# Patient Record
Sex: FEMALE | Race: BLACK OR AFRICAN AMERICAN | NOT HISPANIC OR LATINO | Employment: FULL TIME | URBAN - METROPOLITAN AREA
[De-identification: names, ages, dates, MRNs, and addresses within clinical notes are randomized per-mention and may not be internally consistent; named-entity substitution may affect disease eponyms.]

---

## 2022-07-11 ENCOUNTER — MEDICAL CORRESPONDENCE (OUTPATIENT)
Dept: HEALTH INFORMATION MANAGEMENT | Facility: CLINIC | Age: 52
End: 2022-07-11

## 2022-07-13 ENCOUNTER — TRANSCRIBE ORDERS (OUTPATIENT)
Dept: OTHER | Age: 52
End: 2022-07-13

## 2022-07-13 DIAGNOSIS — C84.A0 CUTANEOUS T-CELL LYMPHOMA, UNSPECIFIED BODY REGION (H): Primary | ICD-10-CM

## 2022-08-13 NOTE — TELEPHONE ENCOUNTER
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 Lanette Dejesus & Dr[facility] Winter Jean @  [facility]. Phone: [phone], Fax: [phone], CTCL clinic w/Dr. Mart, ref-recs in [facility]    RECORDS REQUESTED FROM:       Clinic name Comments Records Status   [facility] Derm 7.11.22, 6.27.22  Oleg  Path # [id number] CE    Oncology 7.28.22  Ding CE    Internal Med 7.22.22  Misha GAMBOA

## 2022-08-15 ENCOUNTER — PRE VISIT (OUTPATIENT)
Dept: DERMATOLOGY | Facility: CLINIC | Age: 52
End: 2022-08-15

## 2022-08-15 ENCOUNTER — OFFICE VISIT (OUTPATIENT)
Dept: DERMATOLOGY | Facility: CLINIC | Age: 52
End: 2022-08-15
Payer: COMMERCIAL

## 2022-08-15 DIAGNOSIS — C84.A0 CUTANEOUS T-CELL LYMPHOMA, UNSPECIFIED BODY REGION (H): ICD-10-CM

## 2022-08-15 PROCEDURE — 99203 OFFICE O/P NEW LOW 30 MIN: CPT | Mod: GC | Performed by: DERMATOLOGY

## 2022-08-15 RX ORDER — EPINEPHRINE 0.3 MG/.3ML
INJECTION SUBCUTANEOUS
COMMUNITY
Start: 2021-10-22

## 2022-08-15 RX ORDER — FAMOTIDINE 20 MG/1
1 TABLET, FILM COATED ORAL AT BEDTIME
COMMUNITY
Start: 2022-06-30

## 2022-08-15 RX ORDER — FLUTICASONE PROPIONATE 50 MCG
1-2 SPRAY, SUSPENSION (ML) NASAL
COMMUNITY
Start: 2021-10-14

## 2022-08-15 RX ORDER — LORATADINE 10 MG/1
10 TABLET ORAL
COMMUNITY

## 2022-08-15 RX ORDER — PANTOPRAZOLE SODIUM 40 MG/1
1 TABLET, DELAYED RELEASE ORAL DAILY
COMMUNITY
Start: 2021-06-30

## 2022-08-15 RX ORDER — DIPHENOXYLATE HYDROCHLORIDE AND ATROPINE SULFATE 2.5; .025 MG/1; MG/1
1 TABLET ORAL DAILY
COMMUNITY

## 2022-08-15 RX ORDER — FLUTICASONE PROPIONATE 50 MCG
SPRAY, SUSPENSION (ML) NASAL
COMMUNITY
Start: 2022-05-01

## 2022-08-15 RX ORDER — OLOPATADINE HYDROCHLORIDE 1 MG/ML
1 SOLUTION/ DROPS OPHTHALMIC
COMMUNITY
Start: 2022-05-02

## 2022-08-15 RX ORDER — TRIAMCINOLONE ACETONIDE 1 MG/G
OINTMENT TOPICAL
COMMUNITY
Start: 2022-07-11

## 2022-08-15 ASSESSMENT — PAIN SCALES - GENERAL: PAINLEVEL: NO PAIN (0)

## 2022-08-15 NOTE — PATIENT INSTRUCTIONS
Based on the tests you've had done thus far, we think you have a low stage of cutaneous T cell lymphoma that seems to be limited to your skin. Yours is stage 1b (>10% of your body surface area), CD8+ (type of T blood cell, hypopigmented variety (lighter patches).    Continue light treatments 2-3 times per week. Continue triamcinolone as prescribed. These treatments can take 4 months to show improvement. We will check in in 3 months and see how things are going.

## 2022-08-15 NOTE — NURSING NOTE
Chief Complaint   Patient presents with     Derm Problem     CTCL. Pt reports condition is ok.     Vickey Diaz, EMT

## 2022-08-15 NOTE — LETTER
Date:August 16, 2022      Provider requested that no letter be sent. Do not send.       [facility]

## 2022-08-15 NOTE — PROGRESS NOTES
[facility] Dermatology Note  Encounter Date: Aug 15, 2022  Office Visit     Dermatology Problem List:  1. Stage 1B, CD8+ hypopigmented MF  - Rash present x 20 years  - >80% BSA  - Workup (done at )  - Skin bx, L hip 6/2022:   - Atypical CD8 positive T-cell lymphocytic infiltrate with epidermotropism. 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 Appears to be skin limited based on the thorough evaluation outlined in the DPL. Discussed the following: while most cases of MF are CD4+, hers is CD8+ as is often seen in the hypopigmented variant. Discussed that many cases of CD8+ CTCL have a quite a favorable prognosis but in rare cases can exhibit rapid progression with extracutaneous dissemination/poor prognosis. Based on exam and workup thus far we expect a good response to skin-directed therapies and favorable prognosis. Light therapy is particularly useful in the hypopigmented variant but can take about 4 months to start showing clinical improvement.  - Continue nbUVB 2-3x weekly  - Continue triamcinolone 0.1% BID to affected areas    Procedures Performed:   None    Follow-up: 3 month(s) in-person, or earlier  "for new or changing lesions    Staff and Resident:     Shirley Hernandez MD  Dermatology Resident PGY3  I, Priyanka Mart MD, saw this patient with the resident and agree with the resident s findings and plan of care as documented in the resident s note.    ____________________________________________    CC: Derm Problem (CTCL. Pt reports condition is ok.)    HPI:  Ms. Jaylon Lugo is a(n) 51 year old female who presents today as a new patient for CTCL referral.    Onset 20 years ago, initial bx on L upper arm at that time showed \"eczema.\" Recently this summer decided to pursue workup again given lonstanding sx and started following at  at which time a repeat bx was done suspicious for CTCL. Systemic workup suggestive of skin involvement only (see DPL). She notes that she has overall felt well recently. On ROS has MSK pains in chest alleviated with OT. Also had an asymptomatic HR below 50 noticed on Fitbit which was worked up with EKG which was normal. She gets tired here and there but that is typically related to less than 8 hours of sleep. No fever, chills, or unintential weight loss. Had some transient inguinal painless LAD starting earlier this summer but this went away and PET this month was negative. Has been doing lights for 4 weeks, going twice weekly, unclear if helpful. Triamcinolone twice daily. History of eczema as well but not for years and this rash is distinctly different. Skin is asymptomatic.     Patient is otherwise feeling well, without additional skin concerns.    Labs Reviewed:  - Skin bx, L hip 6/2022: Atypical CD8 positive T-cell lymphocytic infiltrate with epidermotropism. Marked shift toward CD8 in the CD4:CD8 ratio within the epidermis and there is near total loss of CD7 in the epidermal compartment  - Peripheral smear 7/2022: unremarkable  - Peripheral blood TCR studies: clonal T cell population but no skin TCR studies to correlate  - Peripheral blood flow cytometry 7/28/22: negative " for an aberrant T cell population, CD4:CD8 ratio 0.9:1  - PET 8/10/22: no metabolic evidence of active neoplasm    Physical Exam:  Vitals: There were no vitals taken for this visit.  SKIN: Total skin excluding the undergarment areas was performed. The exam included the head/face, neck, both arms, chest, back, abdomen, both legs, digits and/or nails.   - From the upper chest down there are widespread hypopigmented patches encompassing >80% BSA. Face relatively spared.  - No palpable inguinal LAD  - No other lesions of concern on areas examined.             Medications:  Current Outpatient Medications   Medication     fluticasone (FLONASE) 50 MCG/ACT nasal spray     olopatadine (PATANOL) 0.1 % ophthalmic solution     pantoprazole (PROTONIX) 40 MG EC tablet     triamcinolone (KENALOG) 0.1 % external ointment     EPINEPHrine (ANY BX GENERIC EQUIV) 0.3 MG/0.3ML injection 2-pack     famotidine (PEPCID) 20 MG tablet     fluticasone (FLONASE) 50 MCG/ACT nasal spray     loratadine (CLARITIN) 10 MG tablet     minoxidil (ROGAINE) 5 % external solution     Multiple Vitamin (MULTI-VITAMINS) TABS     No current facility-administered medications for this visit.      Past Medical History: Heart murmur, CLEO  There is no problem list on file for this patient.    History reviewed. No pertinent past medical history.    CC Provider Not In System    on close of this encounter.

## 2022-08-15 NOTE — LETTER
8/15/2022       RE: Jaylon Lugo  [address]  [address]  [place] [address]     Dear Colleague,    Thank you for referring your patient, Jaylon Lugo, to the [facility] DERMATOLOGY [facility] [place] at [facility]. Please see a copy of my visit note below.      [facility] Dermatology Note  Encounter Date: Aug 15, 2022  Office Visit     Dermatology Problem List:  1. Stage 1B, CD8+ hypopigmented MF  - Rash present x 20 years  - >80% BSA  - Workup (done at )  - Skin bx, L hip 6/2022:   - Atypical CD8 positive T-cell lymphocytic infiltrate with epidermotropism. 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 Appears to be skin limited based on the thorough evaluation outlined in the DPL. Discussed the following: while most cases of MF are CD4+, hers is CD8+ as is often seen in the hypopigmented variant. Discussed that many cases of CD8+ CTCL have a quite a favorable prognosis but in rare cases can exhibit rapid progression with extracutaneous dissemination/poor prognosis. Based on exam and workup thus far we expect a good response to skin-directed  "therapies and favorable prognosis. Light therapy is particularly useful in the hypopigmented variant but can take about 4 months to start showing clinical improvement.  - Continue nbUVB 2-3x weekly  - Continue triamcinolone 0.1% BID to affected areas    Procedures Performed:   None    Follow-up: 3 month(s) in-person, or earlier for new or changing lesions    Staff and Resident:     Shirley Hernandez MD  Dermatology Resident PGY3  I, Priyanka Mart MD, saw this patient with the resident and agree with the resident s findings and plan of care as documented in the resident s note.    ____________________________________________    CC: Derm Problem (CTCL. Pt reports condition is ok.)    HPI:  Ms. Jaylon Lugo is a(n) 51 year old female who presents today as a new patient for CTCL referral.    Onset 20 years ago, initial bx on L upper arm at that time showed \"eczema.\" Recently this summer decided to pursue workup again given lonstanding sx and started following at  at which time a repeat bx was done suspicious for CTCL. Systemic workup suggestive of skin involvement only (see DPL). She notes that she has overall felt well recently. On ROS has MSK pains in chest alleviated with OT. Also had an asymptomatic HR below 50 noticed on Fitbit which was worked up with EKG which was normal. She gets tired here and there but that is typically related to less than 8 hours of sleep. No fever, chills, or unintential weight loss. Had some transient inguinal painless LAD starting earlier this summer but this went away and PET this month was negative. Has been doing lights for 4 weeks, going twice weekly, unclear if helpful. Triamcinolone twice daily. History of eczema as well but not for years and this rash is distinctly different. Skin is asymptomatic.     Patient is otherwise feeling well, without additional skin concerns.    Labs Reviewed:  - Skin bx, L hip 6/2022: Atypical CD8 positive T-cell lymphocytic infiltrate with " epidermotropism. Marked shift toward CD8 in the CD4:CD8 ratio within the epidermis and there is near total loss of CD7 in the epidermal compartment  - Peripheral smear 7/2022: unremarkable  - Peripheral blood TCR studies: clonal T cell population but no skin TCR studies to correlate  - Peripheral blood flow cytometry 7/28/22: negative for an aberrant T cell population, CD4:CD8 ratio 0.9:1  - PET 8/10/22: no metabolic evidence of active neoplasm    Physical Exam:  Vitals: There were no vitals taken for this visit.  SKIN: Total skin excluding the undergarment areas was performed. The exam included the head/face, neck, both arms, chest, back, abdomen, both legs, digits and/or nails.   - From the upper chest down there are widespread hypopigmented patches encompassing >80% BSA. Face relatively spared.  - No palpable inguinal LAD  - No other lesions of concern on areas examined.             Medications:  Current Outpatient Medications   Medication     fluticasone (FLONASE) 50 MCG/ACT nasal spray     olopatadine (PATANOL) 0.1 % ophthalmic solution     pantoprazole (PROTONIX) 40 MG EC tablet     triamcinolone (KENALOG) 0.1 % external ointment     EPINEPHrine (ANY BX GENERIC EQUIV) 0.3 MG/0.3ML injection 2-pack     famotidine (PEPCID) 20 MG tablet     fluticasone (FLONASE) 50 MCG/ACT nasal spray     loratadine (CLARITIN) 10 MG tablet     minoxidil (ROGAINE) 5 % external solution     Multiple Vitamin (MULTI-VITAMINS) TABS     No current facility-administered medications for this visit.      Past Medical History: Heart murmur, CLEO  There is no problem list on file for this patient.    History reviewed. No pertinent past medical history.    CC Provider Not In System    on close of this encounter.          Again, thank you for allowing me to participate in the care of your patient.      Sincerely,    Priyanka Mart MD

## 2022-11-16 ENCOUNTER — OFFICE VISIT (OUTPATIENT)
Dept: DERMATOLOGY | Facility: CLINIC | Age: 52
End: 2022-11-16
Payer: COMMERCIAL

## 2022-11-16 VITALS — HEART RATE: 52 BPM | DIASTOLIC BLOOD PRESSURE: 81 MMHG | SYSTOLIC BLOOD PRESSURE: 141 MMHG

## 2022-11-16 DIAGNOSIS — C84.A0 CUTANEOUS T-CELL LYMPHOMA, UNSPECIFIED BODY REGION (H): Primary | ICD-10-CM

## 2022-11-16 PROCEDURE — 99214 OFFICE O/P EST MOD 30 MIN: CPT | Mod: GC | Performed by: DERMATOLOGY

## 2022-11-16 RX ORDER — TRIAMCINOLONE ACETONIDE 1 MG/G
OINTMENT TOPICAL
Status: CANCELLED | OUTPATIENT
Start: 2022-11-16

## 2022-11-16 ASSESSMENT — PAIN SCALES - GENERAL: PAINLEVEL: NO PAIN (1)

## 2022-11-16 NOTE — LETTER
11/16/2022       RE: Jaylon Lugo  [address]  [address]  [place] [address]     Dear Colleague,    Thank you for referring your patient, Jaylon Lugo, to the [facility] DERMATOLOGY CLINIC [place] at [facility]. Please see a copy of my visit note below.    [facility] Dermatology Note  Encounter Date: Nov 16, 2022  Office Visit     Dermatology Problem List:  1. Stage 1B, CD8+ hypopigmented MF  - Rash present x 20 years  - >80% BSA on initial consult; visit 11/16/22 with involvement of dorsal feet, inner arms and legs  - Workup (done at )  - Skin bx, L hip 6/2022:   - Atypical CD8 positive T-cell lymphocytic infiltrate with epidermotropism. 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 Continue to favor skin limited based on the thorough evaluation outlined in the DPL and excellent response so far to UVB BIW. Discussed the following: while most cases of MF are CD4+, hers is CD8+ as is often seen in the hypopigmented variant. Discussed that many cases of CD8+ CTCL have a quite a favorable prognosis but in rare cases can  exhibit rapid progression with extracutaneous dissemination/poor prognosis which fortunately hers has not. Recent CBC with no abnormalities. Based on exam and workup thus far we continue to expect a good response to skin-directed therapies and favorable prognosis. Light therapy is very useful in the hypopigmented variant and she is starting to show clinical improvement. Discussed that legs do take longer to heal compared to trunk and that once disease is in remission can consider restarting lights with emphasis on inner arms which is currently being shadowed during her treatments. We also recommend she start a foot box to treat her dorsal feet. She is endorsing night sweats but has no LAD and no fatigue, weight loss or other B symptoms and normal CBC this week so seems most consistent with menopause.  - Continue nbUVB 2-3x weekly, would start foot unit to treat dorsal feet   - Once all disease is treated, can consider titrating down on light therapies to see if maintenance treatment is needed  - Continue triamcinolone 0.1% BID to affected areas            Follow-up: 4 month(s) in-person, or earlier for new or changing lesions    Staff and Resident:    ICruz MD, discussed and evaluated the patient with Dr. Mart.  IPriyanka MD, saw this patient with the resident and agree with the resident s findings and plan of care as documented in the resident s note.    ____________________________________________    CC: Derm Problem (Follow-up for T-Cell/Things are going okay for patient: bumps on right arm that seem to have disappeared/Check feet)    HPI:  Ms. Jaylon Lugo is a(n) 51 year old female who presents today as a return patient for CTCL. Doing lights at [facility] twice weekly in addition to triam BID. Having some hot flashes and night sweats but no fatigue. Had some bumps on arms that itched used triam ointment and have resolved. Itch is improving. Noticing signficant improvement to appearance of  trunk lesions, still hypopigmentation of legs and feet.    Patient is otherwise feeling well, without additional skin concerns.    Labs Reviewed:  CBC WNL 11/14/22    Physical Exam:  Vitals: There were no vitals taken for this visit.  SKIN: Total skin excluding the undergarment areas was performed. The exam included the head/face, neck, both arms, chest, back, abdomen, both legs, digits and/or nails.   - From the inner arms, dorsal feet and legs there are widespread hypopigmented patches encompassing 40-50% BSA with few small hypopigmented patches on arms and abdomen. There are some patches of hyperpigmentation of right arm. Face relatively spared.  - No palpable cervical, postauricular, submandibular, supraclavicular or axillary LAD  - No other lesions of concern on areas examined.     Medications:  Current Outpatient Medications   Medication     EPINEPHrine (ANY BX GENERIC EQUIV) 0.3 MG/0.3ML injection 2-pack     famotidine (PEPCID) 20 MG tablet     loratadine (CLARITIN) 10 MG tablet     minoxidil (ROGAINE) 5 % external solution     olopatadine (PATANOL) 0.1 % ophthalmic solution     pantoprazole (PROTONIX) 40 MG EC tablet     triamcinolone (KENALOG) 0.1 % external ointment     fluticasone (FLONASE) 50 MCG/ACT nasal spray     fluticasone (FLONASE) 50 MCG/ACT nasal spray     Multiple Vitamin (MULTI-VITAMINS) TABS     No current facility-administered medications for this visit.      Past Medical History:   There is no problem list on file for this patient.    History reviewed. No pertinent past medical history.    CC Provider Not In System    on close of this encounter.      Again, thank you for allowing me to participate in the care of your patient.      Sincerely,    Priyanka Mart MD

## 2022-11-16 NOTE — LETTER
Date:November 18, 2022      Provider requested that no letter be sent. Do not send.       [facility]

## 2022-11-16 NOTE — PROGRESS NOTES
[facility] Dermatology Note  Encounter Date: Nov 16, 2022  Office Visit     Dermatology Problem List:  1. Stage 1B, CD8+ hypopigmented MF  - Rash present x 20 years  - >80% BSA on initial consult; visit 11/16/22 with involvement of dorsal feet, inner arms and legs  - Workup (done at )  - Skin bx, L hip 6/2022:   - Atypical CD8 positive T-cell lymphocytic infiltrate with epidermotropism. 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 Continue to favor skin limited based on the thorough evaluation outlined in the DPL and excellent response so far to UVB BIW. Discussed the following: while most cases of MF are CD4+, hers is CD8+ as is often seen in the hypopigmented variant. Discussed that many cases of CD8+ CTCL have a quite a favorable prognosis but in rare cases can exhibit rapid progression with extracutaneous dissemination/poor prognosis which fortunately hers has not. Recent CBC with no abnormalities. Based on exam and workup thus far we continue to expect a good response to skin-directed therapies and favorable prognosis. Light therapy is very useful in the hypopigmented variant and she is  starting to show clinical improvement. Discussed that legs do take longer to heal compared to trunk and that once disease is in remission can consider restarting lights with emphasis on inner arms which is currently being shadowed during her treatments. We also recommend she start a foot box to treat her dorsal feet. She is endorsing night sweats but has no LAD and no fatigue, weight loss or other B symptoms and normal CBC this week so seems most consistent with menopause.  - Continue nbUVB 2-3x weekly, would start foot unit to treat dorsal feet   - Once all disease is treated, can consider titrating down on light therapies to see if maintenance treatment is needed  - Continue triamcinolone 0.1% BID to affected areas            Follow-up: 4 month(s) in-person, or earlier for new or changing lesions    Staff and Resident:    ICruz MD, discussed and evaluated the patient with Dr. Mart.  IPriyanka MD, saw this patient with the resident and agree with the resident s findings and plan of care as documented in the resident s note.    ____________________________________________    CC: Derm Problem (Follow-up for T-Cell/Things are going okay for patient: bumps on right arm that seem to have disappeared/Check feet)    HPI:  Ms. Jaylon Lugo is a(n) 51 year old female who presents today as a return patient for CTCL. Doing lights at [facility] twice weekly in addition to triam BID. Having some hot flashes and night sweats but no fatigue. Had some bumps on arms that itched used triam ointment and have resolved. Itch is improving. Noticing signficant improvement to appearance of trunk lesions, still hypopigmentation of legs and feet.    Patient is otherwise feeling well, without additional skin concerns.    Labs Reviewed:  CBC WNL 11/14/22    Physical Exam:  Vitals: There were no vitals taken for this visit.  SKIN: Total skin excluding the undergarment areas was performed. The exam included the head/face,  neck, both arms, chest, back, abdomen, both legs, digits and/or nails.   - From the inner arms, dorsal feet and legs there are widespread hypopigmented patches encompassing 40-50% BSA with few small hypopigmented patches on arms and abdomen. There are some patches of hyperpigmentation of right arm. Face relatively spared.  - No palpable cervical, postauricular, submandibular, supraclavicular or axillary LAD  - No other lesions of concern on areas examined.     Medications:  Current Outpatient Medications   Medication     EPINEPHrine (ANY BX GENERIC EQUIV) 0.3 MG/0.3ML injection 2-pack     famotidine (PEPCID) 20 MG tablet     loratadine (CLARITIN) 10 MG tablet     minoxidil (ROGAINE) 5 % external solution     olopatadine (PATANOL) 0.1 % ophthalmic solution     pantoprazole (PROTONIX) 40 MG EC tablet     triamcinolone (KENALOG) 0.1 % external ointment     fluticasone (FLONASE) 50 MCG/ACT nasal spray     fluticasone (FLONASE) 50 MCG/ACT nasal spray     Multiple Vitamin (MULTI-VITAMINS) TABS     No current facility-administered medications for this visit.      Past Medical History:   There is no problem list on file for this patient.    History reviewed. No pertinent past medical history.    CC Provider Not In System    on close of this encounter.

## 2022-11-16 NOTE — PATIENT INSTRUCTIONS
"Skin is looking great today, you are responding very well to the light treatment.    Remember that your prognosis is very good. We want to resolve all of your rash which would mean you are in \"remission\". Once all spots are gone, we can start thinking about treatment after the lights. Unfortunately we can never 100% cure this but patients with this disease live a healthy life with occasional recurrence of the rash which can usually just be treated as needed going forward just like you would eczema flares.    We will see you back in 4 months. Keep using triamcinolone twice daily to lesions and light therapy. Talk to [facility] about starting the foot box.  "

## 2022-11-16 NOTE — NURSING NOTE
Dermatology Rooming Note    Jaylon Lugo's goals for this visit include:   Chief Complaint   Patient presents with     Derm Problem     Follow-up for T-Cell  Things are going okay for patient: bumps on right arm that seem to have disappeared  Check feet     Judie Moulton LPN

## 2023-03-15 ENCOUNTER — OFFICE VISIT (OUTPATIENT)
Dept: DERMATOLOGY | Facility: CLINIC | Age: 53
End: 2023-03-15
Payer: COMMERCIAL

## 2023-03-15 DIAGNOSIS — C84.A0 CUTANEOUS T-CELL LYMPHOMA, UNSPECIFIED BODY REGION (H): Primary | ICD-10-CM

## 2023-03-15 PROCEDURE — 99213 OFFICE O/P EST LOW 20 MIN: CPT | Performed by: DERMATOLOGY

## 2023-03-15 RX ORDER — ESTRADIOL 0.5 MG/1
0.5 TABLET ORAL DAILY
COMMUNITY
Start: 2023-02-14

## 2023-03-15 ASSESSMENT — PAIN SCALES - GENERAL: PAINLEVEL: NO PAIN (0)

## 2023-03-15 NOTE — LETTER
3/15/2023       RE: Jaylon Lugo  [address]  [address]  [place] [address]     Dear Colleague,    Thank you for referring your patient, Jaylon Lugo, to the [facility] DERMATOLOGY CLINIC [place] at [facility]. Please see a copy of my visit note below.    [facility] Dermatology Note  Encounter Date: Mar 15, 2023  Office Visit     Dermatology Problem List:  1. Stage 1B, CD8+ hypopigmented MF  - Rash present x 20 years  - >80% BSA on initial consult; visit 11/16/22 with involvement of dorsal feet, inner arms and legs  - Workup (done at )  - Skin bx, L hip 6/2022:   - Atypical CD8 positive T-cell lymphocytic infiltrate with epidermotropism. 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  Hoping to get resolution of rash completely with clinical remission.   - Reviewed warning signs for systemic involvement and monitoring for change in behavior of rash     Follow-up: 3 month(s) in-person, or earlier for new or changing lesions    Staff:     Priyanka Mart  "MD  ____________________________________________    CC: Derm Problem (CTCL: reports things are \"going okay\"/Areas of concern: arms and legs)    HPI:  Ms. Jaylon Lugo is a(n) 52 year old female who presents today as a return patient for CTCL.  Currently receiving NBUVB twice weekly at [facility].  Did have one sunburn reaction on arms, now better.  Getting extra light treatment for feet.  Overall doing well.  No night sweats, weight loss or significant fatigue    Patient is otherwise feeling well, without additional skin concerns.     Labs Reviewed:  N/A    Physical Exam:  Vitals: There were no vitals taken for this visit.  SKIN: Total skin excluding the undergarment areas but including buttocks was performed. The exam included the head/face, neck, both arms, chest, back, abdomen, both legs, digits and/or nails.   - hypopigmented patches of feet and inner thigh (2%BSA) with mild hyperpigmentation of forearms  - no cervical, submandibular, axillary or inguinal adenopathy   - No other lesions of concern on areas examined.     Medications:  Current Outpatient Medications   Medication     Cholecalciferol (VITAMIN D3 GUMMIES PO)     EPINEPHrine (ANY BX GENERIC EQUIV) 0.3 MG/0.3ML injection 2-pack     estradiol (ESTRACE) 0.5 MG tablet     famotidine (PEPCID) 20 MG tablet     loratadine (CLARITIN) 10 MG tablet     minoxidil (ROGAINE) 5 % external solution     olopatadine (PATANOL) 0.1 % ophthalmic solution     pantoprazole (PROTONIX) 40 MG EC tablet     triamcinolone (KENALOG) 0.1 % external ointment     fluticasone (FLONASE) 50 MCG/ACT nasal spray     fluticasone (FLONASE) 50 MCG/ACT nasal spray     Multiple Vitamin (MULTI-VITAMINS) TABS     No current facility-administered medications for this visit.      Past Medical History:   There is no problem list on file for this patient.    No past medical history on file.    CC Priyanka Mart MD  [address] [facility] [address]  [place] [address] on close of this " encounter.      Again, thank you for allowing me to participate in the care of your patient.      Sincerely,    Priyanka Mart MD

## 2023-03-15 NOTE — LETTER
Date:March 16, 2023      Provider requested that no letter be sent. Do not send.       [facility]

## 2023-03-15 NOTE — NURSING NOTE
"Dermatology Rooming Note    Delegerardo Lugo's goals for this visit include:   Chief Complaint   Patient presents with     Derm Problem     CTCL: reports things are \"going okay\"  Areas of concern: arms and legs     Judie Moulton LPN    "

## 2023-03-15 NOTE — PROGRESS NOTES
"[facility] Dermatology Note  Encounter Date: Mar 15, 2023  Office Visit     Dermatology Problem List:  1. Stage 1B, CD8+ hypopigmented MF  - Rash present x 20 years  - >80% BSA on initial consult; visit 11/16/22 with involvement of dorsal feet, inner arms and legs  - Workup (done at )  - Skin bx, L hip 6/2022:   - Atypical CD8 positive T-cell lymphocytic infiltrate with epidermotropism. 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  Hoping to get resolution of rash completely with clinical remission.   - Reviewed warning signs for systemic involvement and monitoring for change in behavior of rash     Follow-up: 3 month(s) in-person, or earlier for new or changing lesions    Staff:     Priyanka Mart MD  ____________________________________________    CC: Derm Problem (CTCL: reports things are \"going okay\"/Areas of concern: arms and legs)    HPI:  Ms. Jaylon Lugo is a(n) 52 year old female who presents today as a return patient for CTCL.  [facility]  Did have one sunburn reaction on arms, now " better.  Getting extra light treatment for feet.  Overall doing well.  No night sweats, weight loss or significant fatigue    Patient is otherwise feeling well, without additional skin concerns.     Labs Reviewed:  N/A    Physical Exam:  Vitals: There were no vitals taken for this visit.  SKIN: Total skin excluding the undergarment areas but including buttocks was performed. The exam included the head/face, neck, both arms, chest, back, abdomen, both legs, digits and/or nails.   - hypopigmented patches of feet and inner thigh (2%BSA) with mild hyperpigmentation of forearms  - no cervical, submandibular, axillary or inguinal adenopathy   - No other lesions of concern on areas examined.     Medications:  Current Outpatient Medications   Medication     Cholecalciferol (VITAMIN D3 GUMMIES PO)     EPINEPHrine (ANY BX GENERIC EQUIV) 0.3 MG/0.3ML injection 2-pack     estradiol (ESTRACE) 0.5 MG tablet     famotidine (PEPCID) 20 MG tablet     loratadine (CLARITIN) 10 MG tablet     minoxidil (ROGAINE) 5 % external solution     olopatadine (PATANOL) 0.1 % ophthalmic solution     pantoprazole (PROTONIX) 40 MG EC tablet     triamcinolone (KENALOG) 0.1 % external ointment     fluticasone (FLONASE) 50 MCG/ACT nasal spray     fluticasone (FLONASE) 50 MCG/ACT nasal spray     Multiple Vitamin (MULTI-VITAMINS) TABS     No current facility-administered medications for this visit.      Past Medical History:   There is no problem list on file for this patient.    No past medical history on file.    CC Priyanka Mart MD  [address] [address] on close of this encounter.

## 2023-06-26 ENCOUNTER — OFFICE VISIT (OUTPATIENT)
Dept: DERMATOLOGY | Facility: CLINIC | Age: 53
End: 2023-06-26
Payer: COMMERCIAL

## 2023-06-26 DIAGNOSIS — C84.00 HYPOMELANOTIC MYCOSIS FUNGOIDES (H): Primary | ICD-10-CM

## 2023-06-26 PROCEDURE — 99214 OFFICE O/P EST MOD 30 MIN: CPT | Mod: GC | Performed by: DERMATOLOGY

## 2023-06-26 ASSESSMENT — PAIN SCALES - GENERAL: PAINLEVEL: NO PAIN (0)

## 2023-06-26 NOTE — PROGRESS NOTES
[facility] Dermatology Note  Encounter Date: Jun 26, 2023  Office Visit     Dermatology Problem List:    1. 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 Marked shift toward CD8 in the CD4:CD8 ratio within the epidermis and there is near total loss of CD7 in the epidermal compartment  - Labs 7/2022:   - Peripheral smear unremarkable  - HIV and HTLV I/II negative  - LDH unremarkable  - Peripheral blood TCR studies showing clonal T cell population but no skin TCR studies to correlate              - Peripheral blood flow cytometry negative for an aberrant T cell population, CD4:CD8 ratio 0.9:1  - PET 8/2022: no metabolic evidence of active neoplasm  - Current: nbUVB at  twice weekly (started 7/2022; 6:58 minutes), triamcinolone 0.1 oint    ____________________________________________    Assessment & Plan:     # hypopigmented CD8+ mycosis fungoides, currently stage IA  Doing very well on nbUVB with significant improvement in BSA (only ~2% today). Patient is bothered by the remaining hypopigmented lesions although overall much improved compared to prior photos. Feeling well with no B symptoms. There is 1 soft palpable ~1 cm R inguinal lymph node but reassurance provided given low concern at this time.   - Continue triamcinolone 0.1 oint BID  - Continue NBUVB twice weekly at [facility]  - Reviewed warning signs for systemic involvement and monitoring for change in behavior of rash   - Monitor R inguinal lymph node; notify clinic if enlarging or changing      Procedures Performed:   None    Follow-up: 6 month(s) in-person, or earlier for new or changing lesions    Staff and Resident:     Attending: Dr. Mart staffed the patient.    Resident: Kel Ojeda MD (PGY-3)  Priyanka SERVIN  Brittny PATEL, saw this patient with the resident and agree with the resident s findings and plan of care as documented in the resident s note.    ____________________________________________    CC: Derm Problem (Pt following up for CTCL.)    HPI:  Ms. Jaylon Lugo is a(n) 52 year old female who presents today as a return patient for hypopigmented mycosis fungoides.  - Last seen at [facility] 5/12/23  - Last seen at [facility] 3/2023  - Doing well with phototherapy twice a week (full body unit + leg unit)  - Still some persistent spots on feet & inner arms & thighs  - Staying at 6:58 for body phototherapy since 11/2022 due to some side effects of darkening.   - Reports a palpable lymph node on her R inguinal region  - No fevers, chills, weight loss, night sweats    Patient is otherwise feeling well, without additional skin concerns.    Labs Reviewed:  N/A    Physical Exam:  Vitals: There were no vitals taken for this visit.  SKIN: Total skin excluding the undergarment areas was performed. The exam included the head/face, neck, both arms, chest, back, abdomen, both legs, digits and/or nails.   - Bilateral inner arms, bilateral thighs, and dorsal feet/ankles with subtle hypopigmented macules (BSA ~2%, significantly improved compared to initial photos)  - ~1 cm soft palpable R inguinal node   - There is no lymphadenopathy on palpation of cervical and axillary nodes.  - No other lesions of concern on areas examined.     Medications:  Current Outpatient Medications   Medication     Cholecalciferol (VITAMIN D3 GUMMIES PO)     EPINEPHrine (ANY BX GENERIC EQUIV) 0.3 MG/0.3ML injection 2-pack     estradiol (ESTRACE) 0.5 MG tablet     famotidine (PEPCID) 20 MG tablet     minoxidil (ROGAINE) 5 % external solution     olopatadine (PATANOL) 0.1 % ophthalmic solution     pantoprazole (PROTONIX) 40 MG EC tablet     triamcinolone (KENALOG) 0.1 % external ointment     fluticasone (FLONASE) 50 MCG/ACT nasal spray      fluticasone (FLONASE) 50 MCG/ACT nasal spray     loratadine (CLARITIN) 10 MG tablet     Multiple Vitamin (MULTI-VITAMINS) TABS     No current facility-administered medications for this visit.      Past Medical History:   There is no problem list on file for this patient.    No past medical history on file.    CC Priyanka Mart MD  [address] [place] [address] [address]  [place] [address] on close of this encounter.

## 2023-06-26 NOTE — NURSING NOTE
Chief Complaint   Patient presents with     Derm Problem     Pt following up for CTCL.     Vickey Diaz, EMT

## 2023-06-26 NOTE — LETTER
6/26/2023       RE: Jaylon Lugo  [address]  [address]  [place] [address]     Dear Colleague,    Thank you for referring your patient, Jaylon Lugo, to the [facility] DERMATOLOGY CLINIC [place] at [facility]. Please see a copy of my visit note below.    [facility] Dermatology Note  Encounter Date: Jun 26, 2023  Office Visit     Dermatology Problem List:    1. 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 Marked shift toward CD8 in the CD4:CD8 ratio within the epidermis and there is near total loss of CD7 in the epidermal compartment  - Labs 7/2022:   - Peripheral smear unremarkable  - HIV and HTLV I/II negative  - LDH unremarkable  - Peripheral blood TCR studies showing clonal T cell population but no skin TCR studies to correlate              - Peripheral blood flow cytometry negative for an aberrant T cell population, CD4:CD8 ratio 0.9:1  - PET 8/2022: no metabolic evidence of active neoplasm  - Current: nbUVB at  twice weekly (started 7/2022; 6:58 minutes), triamcinolone 0.1 oint    ____________________________________________    Assessment & Plan:     # hypopigmented CD8+ mycosis fungoides, currently stage IA  Doing very well on nbUVB with significant improvement in BSA (only ~2% today). Patient is bothered by the remaining hypopigmented lesions although overall much improved compared to prior photos. Feeling well with no B symptoms. There is 1 soft palpable ~1 cm R inguinal lymph node but reassurance provided given low concern at this time.   - Continue triamcinolone 0.1 oint BID  - Continue NBUVB twice weekly at [facility]  - Reviewed warning signs for systemic involvement and monitoring for  change in behavior of rash   - Monitor R inguinal lymph node; notify clinic if enlarging or changing      Procedures Performed:   None    Follow-up: 6 month(s) in-person, or earlier for new or changing lesions    Staff and Resident:     Attending: Dr. Mart staffed the patient.    Resident: Kel Ojeda MD (PGY-3)  I, Priyanka Mart MD, saw this patient with the resident and agree with the resident s findings and plan of care as documented in the resident s note.    ____________________________________________    CC: Derm Problem (Pt following up for CTCL.)    HPI:  Ms. Jaylon Lugo is a(n) 52 year old female who presents today as a return patient for hypopigmented mycosis fungoides.  - Last seen at [facility] 5/12/23  - Last seen at [facility] 3/2023  - Doing well with phototherapy twice a week (full body unit + leg unit)  - Still some persistent spots on feet & inner arms & thighs  - Staying at 6:58 for body phototherapy since 11/2022 due to some side effects of darkening.   - Reports a palpable lymph node on her R inguinal region  - No fevers, chills, weight loss, night sweats    Patient is otherwise feeling well, without additional skin concerns.    Labs Reviewed:  N/A    Physical Exam:  Vitals: There were no vitals taken for this visit.  SKIN: Total skin excluding the undergarment areas was performed. The exam included the head/face, neck, both arms, chest, back, abdomen, both legs, digits and/or nails.   - Bilateral inner arms, bilateral thighs, and dorsal feet/ankles with subtle hypopigmented macules (BSA ~2%, significantly improved compared to initial photos)  - ~1 cm soft palpable R inguinal node   - There is no lymphadenopathy on palpation of cervical and axillary nodes.  - No other lesions of concern on areas examined.     Medications:  Current Outpatient Medications   Medication    Cholecalciferol (VITAMIN D3 GUMMIES PO)    EPINEPHrine (ANY BX GENERIC EQUIV) 0.3 MG/0.3ML injection 2-pack     estradiol (ESTRACE) 0.5 MG tablet    famotidine (PEPCID) 20 MG tablet    minoxidil (ROGAINE) 5 % external solution    olopatadine (PATANOL) 0.1 % ophthalmic solution    pantoprazole (PROTONIX) 40 MG EC tablet    triamcinolone (KENALOG) 0.1 % external ointment    fluticasone (FLONASE) 50 MCG/ACT nasal spray    fluticasone (FLONASE) 50 MCG/ACT nasal spray    loratadine (CLARITIN) 10 MG tablet    Multiple Vitamin (MULTI-VITAMINS) TABS     No current facility-administered medications for this visit.      Past Medical History:   There is no problem list on file for this patient.    No past medical history on file.    CC Priyanka Mart MD  [address] [facility] [address]  [place] [address] on close of this encounter.

## 2023-12-27 ENCOUNTER — TELEPHONE (OUTPATIENT)
Dept: DERMATOLOGY | Facility: CLINIC | Age: 53
End: 2023-12-27

## 2023-12-27 ENCOUNTER — OFFICE VISIT (OUTPATIENT)
Dept: DERMATOLOGY | Facility: CLINIC | Age: 53
End: 2023-12-27
Payer: COMMERCIAL

## 2023-12-27 DIAGNOSIS — C84.A0 CUTANEOUS T-CELL LYMPHOMA, UNSPECIFIED BODY REGION (H): Primary | ICD-10-CM

## 2023-12-27 PROCEDURE — 99214 OFFICE O/P EST MOD 30 MIN: CPT | Mod: GC | Performed by: DERMATOLOGY

## 2023-12-27 RX ORDER — CLOBETASOL PROPIONATE 0.5 MG/G
OINTMENT TOPICAL 2 TIMES DAILY
Qty: 120 G | Refills: 5 | Status: SHIPPED | OUTPATIENT
Start: 2023-12-27

## 2023-12-27 ASSESSMENT — PAIN SCALES - GENERAL: PAINLEVEL: NO PAIN (0)

## 2023-12-27 NOTE — TELEPHONE ENCOUNTER
CTCL.  Was seen today (12/27).  Plan to follow up in April.  Is it okay to schedule in June?  Or did you want to work her in?  Please advise.

## 2023-12-27 NOTE — PATIENT INSTRUCTIONS
For the stubborn areas (buttocks, tops of feet, inner upper arms)  - Switch to clobetasol ointment twice daily (not safe for face)  - Continue lights as you have been. Increasing the time probably won't make a huge difference. Exposing the stubborn areas to the lights may result in burning, so let's try the clobetasol ointment for now.    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 Please get plugged back in with neurology, etc to help you manage these symptoms.    Your lymph node exam was stable today and it appears stable on imaging.

## 2023-12-27 NOTE — TELEPHONE ENCOUNTER
[facility] Call Center    Phone Message    May a detailed message be left on voicemail: yes     Reason for Call: Other: Pt called and was told to come back in April by Dr. Mart but the next opening is not until June. Pt said that Dr. Mart wants to see the pt in April. Please call the pt back to discuss. Thanks      Action Taken: Message routed to:  [facility] DERM    Travel Screening: Not Applicable

## 2023-12-27 NOTE — LETTER
12/27/2023       RE: Jaylon Lugo  [address]  [address]  [place] [address]     Dear Colleague,    Thank you for referring your patient, Jaylon Lugo, to the [facility]ERMATOLOG[facility] [place] at [facility]. Please see a copy of my visit note below.    [facility] Dermatology Note  Encounter Date: Dec 27, 2023  Office Visit     Dermatology Problem List:    1. 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 Marked shift toward CD8 in the CD4:CD8 ratio within the epidermis and there is near total loss of CD7 in the epidermal compartment  - Labs 7/2022:   - Peripheral smear unremarkable  - HIV and HTLV I/II negative  - LDH unremarkable  - Peripheral blood TCR studies showing clonal T cell population but no skin TCR studies to correlate              - Peripheral blood flow cytometry negative for an aberrant T cell population, CD4:CD8 ratio 0.9:1  - PET 8/2022: no metabolic evidence of active neoplasm  - Stable enlarged inguinal LAD   - Palpable R inguinal 1 cm node  - Inguinal US 8/2023 at : enlarged bilateral inguinal lymph nodes  - Repeat inguinal US 11/2023 at : stable  - Current: nbUVB at  twice weekly (started 7/2022; holding at 7:19 minutes), triamcinolone 0.1% oint    ____________________________________________    Assessment & Plan:     # Hypopigmented CD8+ mycosis fungoides, currently stage IA  Doing very well on nbUVB with significant improvement in BSA (only ~2% today). Patient is bothered by the remaining hypopigmented lesions although overall much improved compared to prior photos. Feeling well with no B symptoms. There is 1 soft palpable ~1 cm R inguinal lymph node that has been previously  noted, but reassurance provided given low concern at this time and two sequential inguinal US showing stable lymph nodes.  - Switch to clobetasol 0.1% oint BID for use on stubborn areas  - Continue nbUVB twice weekly at [facility]. Fine to hold at current times.  - Reviewed warning signs for systemic involvement and monitoring for change in behavior of rash   - Monitor R inguinal lymph node; notify clinic if enlarging or changing    Procedures Performed:   None    Follow-up: 4 month(s) in-person, or earlier for new or changing lesions    Staff and Resident:     Shirley Hernandez MD  Dermatology Resident PGY4  I, Priyanka Mart MD, saw this patient with the resident and agree with the resident s findings and plan of care as documented in the resident s note.    ____________________________________________    CC: Derm Problem (CTCL- thinks things are going well. )    HPI:  Ms. Jaylon Lugo is a(n) 53 year old female who presents today as a return patient for hypopigmented mycosis fungoides. Last seen by us 6m ago at which time she was doing well overall with stable R inguinal LAD. Last seen by  9/2023. Since we've seen her, she has had 2 sequential inguinal ultrasounds which showed stable bilateral LAD. At some point she was switched from triamcinolone to fluocinonide.  Her rash is overall significantly improved.  She has a few recalcitrant areas: Her inner upper arms, below her buttocks, and on her anterior feet.  She is holding lights at 7 minutes 19 seconds on the body and doing over 13 minutes on the feet.  She wonders if she should expose these areas to more light.  She has a history of upper body pain including arm pain that went away with deep tissue massage, but it is starting to come back.  She wonders if this is related to her lymphoma. No fevers, chills, weight loss, night sweats    Patient is otherwise feeling well, without additional skin concerns.    Labs Reviewed:  N/A    Physical  Exam:  Vitals: There were no vitals taken for this visit.  SKIN: Total skin excluding the undergarment areas was performed. The exam included the head/face, neck, both arms, chest, back, abdomen, both legs, digits and/or nails.   - Bilateral inner upper arms, below the buttocks bilaterally there are patchy hypopigmented patches. Dorsal feet/ankles with subtle hypopigmented macules (BSA ~2%, significantly improved compared to initial photos)  - ~1 cm soft palpable R inguinal node   - There is no lymphadenopathy on palpation of cervical and axillary nodes.  - No other lesions of concern on areas examined.     Medications:  Current Outpatient Medications   Medication    Cholecalciferol (VITAMIN D3 GUMMIES PO)    EPINEPHrine (ANY BX GENERIC EQUIV) 0.3 MG/0.3ML injection 2-pack    estradiol (ESTRACE) 0.5 MG tablet    famotidine (PEPCID) 20 MG tablet    loratadine (CLARITIN) 10 MG tablet    minoxidil (ROGAINE) 5 % external solution    olopatadine (PATANOL) 0.1 % ophthalmic solution    pantoprazole (PROTONIX) 40 MG EC tablet    triamcinolone (KENALOG) 0.1 % external ointment    fluticasone (FLONASE) 50 MCG/ACT nasal spray    fluticasone (FLONASE) 50 MCG/ACT nasal spray    Multiple Vitamin (MULTI-VITAMINS) TABS     No current facility-administered medications for this visit.      Past Medical History:   There is no problem list on file for this patient.    History reviewed. No pertinent past medical history.    CC Priyanka Mart MD  [address]  [place] [address] on close of this encounter.

## 2023-12-27 NOTE — PROGRESS NOTES
[facility] Dermatology Note  Encounter Date: Dec 27, 2023  Office Visit     Dermatology Problem List:    1. 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 Marked shift toward CD8 in the CD4:CD8 ratio within the epidermis and there is near total loss of CD7 in the epidermal compartment  - Labs 7/2022:   - Peripheral smear unremarkable  - HIV and HTLV I/II negative  - LDH unremarkable  - Peripheral blood TCR studies showing clonal T cell population but no skin TCR studies to correlate              - Peripheral blood flow cytometry negative for an aberrant T cell population, CD4:CD8 ratio 0.9:1  - PET 8/2022: no metabolic evidence of active neoplasm  - Stable enlarged inguinal LAD   - Palpable R inguinal 1 cm node  - Inguinal US 8/2023 at : enlarged bilateral inguinal lymph nodes  - Repeat inguinal US 11/2023 at : stable  - Current: nbUVB at  twice weekly (started 7/2022; holding at 7:19 minutes), triamcinolone 0.1% oint    ____________________________________________    Assessment & Plan:     # Hypopigmented CD8+ mycosis fungoides, currently stage IA  Doing very well on nbUVB with significant improvement in BSA (only ~2% today). Patient is bothered by the remaining hypopigmented lesions although overall much improved compared to prior photos. Feeling well with no B symptoms. There is 1 soft palpable ~1 cm R inguinal lymph node that has been previously noted, but reassurance provided given low concern at this time and two sequential inguinal US showing stable lymph nodes.  - Switch to clobetasol 0.1% oint BID for use on stubborn areas  - Continue nbUVB twice weekly at [facility]. Fine to hold at current times.  - Reviewed warning signs for systemic involvement and  monitoring for change in behavior of rash   - Monitor R inguinal lymph node; notify clinic if enlarging or changing    Procedures Performed:   None    Follow-up: 4 month(s) in-person, or earlier for new or changing lesions    Staff and Resident:     Shirley Hernandez MD  Dermatology Resident PGY4  I, Priyanka Mart MD, saw this patient with the resident and agree with the resident s findings and plan of care as documented in the resident s note.    ____________________________________________    CC: Derm Problem (CTCL- thinks things are going well. )    HPI:  Ms. Jaylon Lugo is a(n) 53 year old female who presents today as a return patient for hypopigmented mycosis fungoides. Last seen by us 6m ago at which time she was doing well overall with stable R inguinal LAD. Last seen by  9/2023. Since we've seen her, she has had 2 sequential inguinal ultrasounds which showed stable bilateral LAD. At some point she was switched from triamcinolone to fluocinonide.  Her rash is overall significantly improved.  She has a few recalcitrant areas: Her inner upper arms, below her buttocks, and on her anterior feet.  She is holding lights at 7 minutes 19 seconds on the body and doing over 13 minutes on the feet.  She wonders if she should expose these areas to more light.  She has a history of upper body pain including arm pain that went away with deep tissue massage, but it is starting to come back.  She wonders if this is related to her lymphoma. No fevers, chills, weight loss, night sweats    Patient is otherwise feeling well, without additional skin concerns.    Labs Reviewed:  N/A    Physical Exam:  Vitals: There were no vitals taken for this visit.  SKIN: Total skin excluding the undergarment areas was performed. The exam included the head/face, neck, both arms, chest, back, abdomen, both legs, digits and/or nails.   - Bilateral inner upper arms, below the buttocks bilaterally there are patchy hypopigmented patches.  Dorsal feet/ankles with subtle hypopigmented macules (BSA ~2%, significantly improved compared to initial photos)  - ~1 cm soft palpable R inguinal node   - There is no lymphadenopathy on palpation of cervical and axillary nodes.  - No other lesions of concern on areas examined.     Medications:  Current Outpatient Medications   Medication    Cholecalciferol (VITAMIN D3 GUMMIES PO)    EPINEPHrine (ANY BX GENERIC EQUIV) 0.3 MG/0.3ML injection 2-pack    estradiol (ESTRACE) 0.5 MG tablet    famotidine (PEPCID) 20 MG tablet    loratadine (CLARITIN) 10 MG tablet    minoxidil (ROGAINE) 5 % external solution    olopatadine (PATANOL) 0.1 % ophthalmic solution    pantoprazole (PROTONIX) 40 MG EC tablet    triamcinolone (KENALOG) 0.1 % external ointment    fluticasone (FLONASE) 50 MCG/ACT nasal spray    fluticasone (FLONASE) 50 MCG/ACT nasal spray    Multiple Vitamin (MULTI-VITAMINS) TABS     No current facility-administered medications for this visit.      Past Medical History:   There is no problem list on file for this patient.    History reviewed. No pertinent past medical history.    CC Priyanka Mart MD  [address]  [place] [address] on close of this encounter.

## 2023-12-27 NOTE — NURSING NOTE
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      Lucille Perez, EMT

## 2024-04-16 ENCOUNTER — OFFICE VISIT (OUTPATIENT)
Dept: DERMATOLOGY | Facility: CLINIC | Age: 54
End: 2024-04-16
Attending: DERMATOLOGY
Payer: COMMERCIAL

## 2024-04-16 DIAGNOSIS — C84.00 HYPOMELANOTIC MYCOSIS FUNGOIDES (H): Primary | ICD-10-CM

## 2024-04-16 DIAGNOSIS — L71.0 PERIORAL DERMATITIS: ICD-10-CM

## 2024-04-16 DIAGNOSIS — I83.92 ASYMPTOMATIC VARICOSE VEINS OF LEFT LOWER EXTREMITY: ICD-10-CM

## 2024-04-16 PROCEDURE — 99214 OFFICE O/P EST MOD 30 MIN: CPT | Mod: GC | Performed by: DERMATOLOGY

## 2024-04-16 ASSESSMENT — PAIN SCALES - GENERAL: PAINLEVEL: NO PAIN (0)

## 2024-04-16 NOTE — PROGRESS NOTES
[facility] Dermatology Note    Encounter Date: Apr 16, 2024    Dermatology Problem List:  1. Stage 1A, CD8+ hypopigmented MF (previously stage 1B)  - Rash present x 20 years  - >80% BSA on initial consult; visit 4/16/24 with BSA ~1%  - Workup (done at )  - Skin bx, L hip 6/2022:   - Atypical CD8 positive T-cell lymphocytic infiltrate with epidermotropism. Marked shift toward CD8 in the CD4:CD8 ratio within the epidermis and there is near total loss of CD7 in the epidermal compartment  - Labs 7/2022:   - Peripheral smear unremarkable  - HIV and HTLV I/II negative  - LDH unremarkable  - Peripheral blood TCR studies showing clonal T cell population but no skin TCR studies to correlate              - Peripheral blood flow cytometry negative for an aberrant T cell population, CD4:CD8 ratio 0.9:1  - PET 8/2022: no metabolic evidence of active neoplasm  - Flow cytometry 1/2024: negative  - Enlarged inguinal LAD - not palpated 4/16/24              - had palpable R inguinal 1 cm node  - Inguinal US 8/2023 at : enlarged bilateral inguinal lymph nodes  - Repeat inguinal US 11/2023 at : stable  - Current: nbUVB at  twice weekly (started 7/2022; holding at 7:19 minutes), triamcinolone 0.1% oint  ______________________________________    Impression/Plan:  Jaylon was seen today for skin check.    Diagnoses and all orders for this visit:    Hypomelanotic mycosis fungoides (H)  Doing very well on nbUVB with significant improvement in BSA, ~1% today.   Had flow cytometry done with Dr. Lemus in January, was fortunately negative.  Topical clobetasol makes skin burn so has been alternating between this and fluticasone for stubborn areas.   Continues nbUVB twice weekly at [facility].   Agreeable to being apart of [employer] Cutaneous Lymphoma Registry, Fellow met with patient today to discuss next steps.     Perioral dermatitis  Counseled patient to avoid using topical steroid on face moving forward.  -      "metroNIDAZOLE (METROCREAM) 0.75 % external cream; Apply topically 2 times daily    Asymptomatic varicose veins of left lower extremity  Located at L lateroposterior knee, asymptomatic. Counseled patient to let us know if become bothersome, can refer to vascular if necessary.     Other orders  -     Adult Dermatology Clinic Follow-Up Order (Blank)      Follow-up in 6 months.       Staff Involved:  Eric Cruz MD - Family Medicine Resident    Priyanka Mart MD  Professor of Dermatology  Department of Dermatology  [facility] [employer]  I, Priyanka Mart MD, saw this patient with the resident and agree with the resident s findings and plan of care as documented in the resident s note.      CC:   Chief Complaint   Patient presents with    Skin Check     The patient reports lesions of concern on their leg that they first noticed 1 month ago. The patient reports some pain in the affected areas. The patient would like a FBSE.       History of Present Illness:  Ms. Jaylon Lugo is a 53 year old female who presents as a return patient. Last seen 12/2023. Dr. Lemus did flow cytometry in January, was normal.     Has had a \"bump\" behind L knee but just recently noted it has been growing.   Also having \"dark spots\" arise in groin area over past 6 weeks. Thinks they may have been more irritated previously, \"especially from my thighs being together.\"   Hypopigmentation also new along side of R 4th digit.   Has been alternating between clobetasol and fluticasone every few days d/t clobetasol making skin burn/sting. Has been applying topical steroid to face.   Still doing nbUVB twice weekly at [facility].   Agreeable to being apart of [facility] study.     Labs:  None    Physical exam:  Vitals: There were no vitals taken for this visit.  GEN: well developed, well-nourished, in no acute distress, in a pleasant mood.     - Total skin excluding the undergarment areas was performed. The exam included the " head/face, neck, both arms, chest, back, abdomen, both legs, digits and/or nails.   - hypopigmented patch on lateral 4th digit of R hand.  - hypopigmented macules on bilateral dorsal feet as noted previously - improving.  - subcutaneous, dilated, tortuous vein on L lateroposterior knee, non-tender.   - post-inflammatory hyperpigmentation of follicles on bilateral groin.   - no submandibular, cervical, axillar or inguinal LAD.     Past Medical History:   History reviewed. No pertinent past medical history.  History reviewed. No pertinent surgical history.    Social History:   reports that she has never smoked. She has never used smokeless tobacco.    Family History:  Family History   Problem Relation Age of Onset    Skin Cancer No family hx of     Melanoma No family hx of        Medications:  Current Outpatient Medications   Medication Sig Dispense Refill    Cholecalciferol (VITAMIN D3 GUMMIES PO)       clobetasol (TEMOVATE) 0.05 % external ointment Apply topically 2 times daily 120 g 5    EPINEPHrine (ANY BX GENERIC EQUIV) 0.3 MG/0.3ML injection 2-pack Inject 0.3 mL intramuscularly as needed. May repeat.      estradiol (ESTRACE) 0.5 MG tablet Take 0.5 mg by mouth daily      famotidine (PEPCID) 20 MG tablet Take 1 tablet by mouth At Bedtime      fluticasone (FLONASE) 50 MCG/ACT nasal spray 1-2 sprays      fluticasone (FLONASE) 50 MCG/ACT nasal spray       loratadine (CLARITIN) 10 MG tablet Take 10 mg by mouth      metroNIDAZOLE (METROCREAM) 0.75 % external cream Apply topically 2 times daily 45 g 2    minoxidil (ROGAINE) 5 % external solution       Multiple Vitamin (MULTI-VITAMINS) TABS Take 1 tablet by mouth daily      olopatadine (PATANOL) 0.1 % ophthalmic solution 1 drop      pantoprazole (PROTONIX) 40 MG EC tablet Take 1 tablet by mouth daily      triamcinolone (KENALOG) 0.1 % external ointment        Allergies   Allergen Reactions    Shellfish Allergy Other (See Comments)     Itching and swelling of mouth,lips  and possibly throat     Food Other (See Comments)     Red and yellow peppers---itchy tongue

## 2024-04-16 NOTE — LETTER
4/16/2024       RE: Jaylon Lugo  [address]  [address]  [place] [address]     Dear Colleague,    Thank you for referring your patient, Jaylon Lugo, to the [facility] D[facility]MATOLOG[facility] CLIN[facility] [place] at [facility]. Please see a copy of my visit note below.    [facility] Dermatology Note    Encounter Date: Apr 16, 2024    Dermatology Problem List:  1. Stage 1A, CD8+ hypopigmented MF (previously stage 1B)  - Rash present x 20 years  - >80% BSA on initial consult; visit 4/16/24 with BSA ~1%  - Workup (done at )  - Skin bx, L hip 6/2022:   - Atypical CD8 positive T-cell lymphocytic infiltrate with epidermotropism. Marked shift toward CD8 in the CD4:CD8 ratio within the epidermis and there is near total loss of CD7 in the epidermal compartment  - Labs 7/2022:   - Peripheral smear unremarkable  - HIV and HTLV I/II negative  - LDH unremarkable  - Peripheral blood TCR studies showing clonal T cell population but no skin TCR studies to correlate              - Peripheral blood flow cytometry negative for an aberrant T cell population, CD4:CD8 ratio 0.9:1  - PET 8/2022: no metabolic evidence of active neoplasm  - Flow cytometry 1/2024: negative  - Enlarged inguinal LAD - not palpated 4/16/24              - had palpable R inguinal 1 cm node  - Inguinal US 8/2023 at : enlarged bilateral inguinal lymph nodes  - Repeat inguinal US 11/2023 at : stable  - Current: nbUVB at  twice weekly (started 7/2022; holding at 7:19 minutes), triamcinolone 0.1% oint  ______________________________________    Impression/Plan:  Jaylon was seen today for skin check.    Diagnoses and all orders for this visit:    Hypomelanotic mycosis fungoides (H)  Doing very well on nbUVB with significant improvement in BSA, ~1% today.   Had flow cytometry done with Dr. Lemus in January, was fortunately negative.  Topical clobetasol makes skin burn so has been  "alternating between this and fluticasone for stubborn areas.   Continues n[facility]UVB twice weekly at [facility].   Agreeable to being apart of National Cutaneous Lymphoma Registry, Fellow met with patient today to discuss next steps.     Perioral dermatitis  Counseled patient to avoid using topical steroid on face moving forward.  -     metroNIDAZOLE (METROCREAM) 0.75 % external cream; Apply topically 2 times daily    Asymptomatic varicose veins of left lower extremity  Located at L lateroposterior knee, asymptomatic. Counseled patient to let us know if become bothersome, can refer to vascular if necessary.     Other orders  -     Adult Dermatology Clinic Follow-Up Order (Blank)      Follow-up in 6 months.       Staff Involved:  Eric Cruz MD - Family Medicine Resident    Priyanka Mart MD  Professor of Dermatology  Department of Dermatology  [facility] [employer]  I, Priyanka Mart MD, saw this patient with the resident and agree with the resident s findings and plan of care as documented in the resident s note.      CC:   Chief Complaint   Patient presents with    Skin Check     The patient reports lesions of concern on their leg that they first noticed 1 month ago. The patient reports some pain in the affected areas. The patient would like a FBSE.       History of Present Illness:  Ms. Jaylon Lugo is a 53 year old female who presents as a return patient. Last seen 12/2023. Dr. Lemus did flow cytometry in January, was normal.     Has had a \"bump\" behind L knee but just recently noted it has been growing.   Also having \"dark spots\" arise in groin area over past 6 weeks. Thinks they may have been more irritated previously, \"especially from my thighs being together.\"   Hypopigmentation also new along side of R 4th digit.   Has been alternating between clobetasol and fluticasone every few days d/t clobetasol making skin burn/sting. Has been applying topical steroid to face.   Still " doing nbUVB twice weekly at [facility].   Agreeable to being apart of [employer] study.     Labs:  None    Physical exam:  Vitals: There were no vitals taken for this visit.  GEN: well developed, well-nourished, in no acute distress, in a pleasant mood.     - Total skin excluding the undergarment areas was performed. The exam included the head/face, neck, both arms, chest, back, abdomen, both legs, digits and/or nails.   - hypopigmented patch on lateral 4th digit of R hand.  - hypopigmented macules on bilateral dorsal feet as noted previously - improving.  - subcutaneous, dilated, tortuous vein on L lateroposterior knee, non-tender.   - post-inflammatory hyperpigmentation of follicles on bilateral groin.   - no submandibular, cervical, axillar or inguinal LAD.     Past Medical History:   History reviewed. No pertinent past medical history.  History reviewed. No pertinent surgical history.    Social History:   reports that she has never smoked. She has never used smokeless tobacco.    Family History:  Family History   Problem Relation Age of Onset    Skin Cancer No family hx of     Melanoma No family hx of        Medications:  Current Outpatient Medications   Medication Sig Dispense Refill    Cholecalciferol (VITAMIN D3 GUMMIES PO)       clobetasol (TEMOVATE) 0.05 % external ointment Apply topically 2 times daily 120 g 5    EPINEPHrine (ANY BX GENERIC EQUIV) 0.3 MG/0.3ML injection 2-pack Inject 0.3 mL intramuscularly as needed. May repeat.      estradiol (ESTRACE) 0.5 MG tablet Take 0.5 mg by mouth daily      famotidine (PEPCID) 20 MG tablet Take 1 tablet by mouth At Bedtime      fluticasone (FLONASE) 50 MCG/ACT nasal spray 1-2 sprays      fluticasone (FLONASE) 50 MCG/ACT nasal spray       loratadine (CLARITIN) 10 MG tablet Take 10 mg by mouth      metroNIDAZOLE (METROCREAM) 0.75 % external cream Apply topically 2 times daily 45 g 2    minoxidil (ROGAINE) 5 % external solution       Multiple Vitamin (MULTI-VITAMINS)  TABS Take 1 tablet by mouth daily      olopatadine (PATANOL) 0.1 % ophthalmic solution 1 drop      pantoprazole (PROTONIX) 40 MG EC tablet Take 1 tablet by mouth daily      triamcinolone (KENALOG) 0.1 % external ointment        Allergies   Allergen Reactions    Shellfish Allergy Other (See Comments)     Itching and swelling of mouth,lips and possibly throat     Food Other (See Comments)     Red and yellow peppers---itchy tongue

## 2024-04-16 NOTE — NURSING NOTE
Chief Complaint   Patient presents with    Skin Check     The patient reports lesions of concern on their leg that they first noticed 1 month ago. The patient reports some pain in the affected areas. The patient would like a FBSE.     Kathy Carroll LPN

## 2024-10-16 ENCOUNTER — OFFICE VISIT (OUTPATIENT)
Dept: DERMATOLOGY | Facility: CLINIC | Age: 54
End: 2024-10-16
Payer: COMMERCIAL

## 2024-10-16 DIAGNOSIS — C84.00 HYPOMELANOTIC MYCOSIS FUNGOIDES (H): Primary | ICD-10-CM

## 2024-10-16 PROCEDURE — 99214 OFFICE O/P EST MOD 30 MIN: CPT | Performed by: DERMATOLOGY

## 2024-10-16 ASSESSMENT — PAIN SCALES - GENERAL: PAINLEVEL: NO PAIN (0)

## 2024-10-16 NOTE — NURSING NOTE
Dermatology Rooming Note    Jaylon Lugo's goals for this visit include:   Chief Complaint   Patient presents with    Derm Problem     CTCL- Mostly stable. Still concerned about the feet.     Emmanuel Gallegos, EMT  Clinic Support  [facility]     [phone]    Employed by [employer]     Garry Arana is a 58 year old male presenting with medication check.  Pt reports he is here for medication check.  No other concerns.   Refills needed? Set to fax   Care team updated? Up to date   Pt PCP Garry Mejia MD    Denies known Latex allergy or symptoms of Latex sensitivity.  Medications verified, no changes.  Social History     Tobacco Use   Smoking Status Never Smoker   Smokeless Tobacco Never Used         Health Maintenance Due   Topic Date Due   • Shingles Vaccine (1 of 2) 08/03/2013   • Influenza Vaccine (1) 09/01/2020       Patient is due for topics listed above, he wishes to proceed with Immunization(s) Influenza, but is not proceeding with Immunization(s) Shingles at this time. The following has occurred: Orders placed for Immunization(s) Influenza.             Spoke with mom, she tried once to call but was having trouble scheduling d/t her work schedule.    Mom stated that she will call tomorrow to schedule.  Mom aware we will call and follow up on Monday to see when tests are scheduled.

## 2024-10-16 NOTE — LETTER
10/16/2024       RE: Jaylon Lugo  [address]  [address]  [place] [address]     Dear Colleague,    Thank you for referring your patient, Jaylon Lugo, to the [facility] DERMATOLOGY CLINIC [place] at [facility]. Please see a copy of my visit note below.    [facility] Dermatology Note    Encounter Date: Oct 16, 2024    Dermatology Problem List:  1. Stage 1A, CD8+ hypopigmented MF (previously stage 1B)  May not completely clear with light therapy, however, does not mean that disease will become out of control. Discussed possibly stepping down to once a week vs. Stopping it for maintenance. Shared decision making was made where pt felt more comfortable with 1x/wk and moving to stopping during the winter given more clothing and less sun exposure to feet, but does not have to happen at this time. If more areas are appearing, will step back up to 2x/wk. Will see back in the spring and re-address.   - Rash present x 20 years  - >80% BSA on initial consult; visit 10/16/24 with BSA ~ Less than 1%  - Workup (done at )  - Skin bx, L hip 6/2022:   - Atypical CD8 positive T-cell lymphocytic infiltrate with epidermotropism. Marked shift toward CD8 in the CD4:CD8 ratio within the epidermis and there is near total loss of CD7 in the epidermal compartment  - Labs 7/2022:   - Peripheral smear unremarkable  - HIV and HTLV I/II negative  - LDH unremarkable  - Peripheral blood TCR studies showing clonal T cell population but no skin TCR studies to correlate              - Peripheral blood flow cytometry negative for an aberrant T cell population, CD4:CD8 ratio 0.9:1  - PET 8/2022: no metabolic evidence of active neoplasm  - Flow cytometry 1/2024: negative  - Enlarged inguinal LAD - not palpated 10/16/24              - had palpable R inguinal 1 cm node  - Inguinal US 8/2023 at : enlarged bilateral inguinal lymph nodes  - Repeat inguinal US  11/2023 at : stable  - Current: nbUVB at  once weekly (started 7/2022; holding at 7:19 minutes), triamcinolone 0.1% oint   > Shared decision-making to reduce to 1x/wk from 2x/wk   > Discussed abstaining from regular tanning bed d/t different wavelength of light    > Discussed likely lack of benefit of having a home box unit at this time for phototherapy   ______________________________________    Impression/Plan:      Diagnoses and all orders for this visit:    Hypomelanotic mycosis fungoides (H)  Doing very well on nbUVB with significant improvement in BSA, ~1% today.   - Current: nbUVB at  with Dr Lemus and will decrease to once weekly    > Shared decision-making to reduce to 1x/wk from 2x/wk   > Discussed abstaining from regular tanning bed d/t different wavelength of light    > Discussed likely lack of benefit of having a home box unit at this time for phototherapy    > Hold clobetasol for feet but may use fluticasone as needed  May not completely clear with light therapy, however, does not mean that disease will become out of control. Discussed possibly stepping down to once a week vs. Stopping it for maintenance. Shared decision making was made where pt felt more comfortable with 1x/wk and moving to stopping during the winter given more clothing and less sun exposure to feet, but does not have to happen at this time. If more areas are appearing, will step back up to 2x/wk. Will see back in the spring and re-address.           Follow-up in 6 months.       Staff Involved:  Priyanka Mart MD    CC:   Chief Complaint   Patient presents with     Derm Problem     CTCL- Mostly stable. Still concerned about the feet.       History of Present Illness:  Ms. Jaylon Lugo is a 53 year old female who presents as a return patient.   10/16/24:   States that her hypopigmentation has been improving, but some is still present in her BLE. Denies any pruritus or burning. Has no concerns with continuing light treatment  at this time. Appetite has been going well and she denies any night sweats at this time. She does report starting exercise through boxing that she enjoys but did have an injury.     Labs:  None    Physical exam:  Vitals: There were no vitals taken for this visit.  GEN: well developed, well-nourished, in no acute distress, in a pleasant mood.     Focused exam of feet  - hypopigmented patch of dorsal feet (<1% BSA)    Past Medical History:   History reviewed. No pertinent past medical history.  History reviewed. No pertinent surgical history.    Social History:   reports that she has never smoked. She has never used smokeless tobacco.    Family History:  Family History   Problem Relation Age of Onset     Skin Cancer No family hx of      Melanoma No family hx of        Medications:  Current Outpatient Medications   Medication Sig Dispense Refill     Cholecalciferol (VITAMIN D3 GUMMIES PO)        clobetasol (TEMOVATE) 0.05 % external ointment Apply topically 2 times daily 120 g 5     EPINEPHrine (ANY BX GENERIC EQUIV) 0.3 MG/0.3ML injection 2-pack Inject 0.3 mL intramuscularly as needed. May repeat.       estradiol (ESTRACE) 0.5 MG tablet Take 0.5 mg by mouth daily       famotidine (PEPCID) 20 MG tablet Take 1 tablet by mouth At Bedtime       fluticasone (FLONASE) 50 MCG/ACT nasal spray 1-2 sprays       fluticasone (FLONASE) 50 MCG/ACT nasal spray        loratadine (CLARITIN) 10 MG tablet Take 10 mg by mouth       metroNIDAZOLE (METROCREAM) 0.75 % external cream Apply topically 2 times daily 45 g 2     olopatadine (PATANOL) 0.1 % ophthalmic solution 1 drop       pantoprazole (PROTONIX) 40 MG EC tablet Take 1 tablet by mouth daily       minoxidil (ROGAINE) 5 % external solution  (Patient not taking: Reported on 10/16/2024)       Multiple Vitamin (MULTI-VITAMINS) TABS Take 1 tablet by mouth daily (Patient not taking: Reported on 10/16/2024)       triamcinolone (KENALOG) 0.1 % external ointment  (Patient not taking:  Reported on 10/16/2024)       Allergies   Allergen Reactions     Shellfish Allergy Other (See Comments)     Itching and swelling of mouth,lips and possibly throat      Food Other (See Comments)     Red and yellow peppers---itchy tongue                    Again, thank you for allowing me to participate in the care of your patient.      Sincerely,    Priyanka Mart MD

## 2024-10-16 NOTE — PROGRESS NOTES
[facility] Dermatology Note    Encounter Date: Oct 16, 2024    Dermatology Problem List:  1. Stage 1A, CD8+ hypopigmented MF (previously stage 1B)  May not completely clear with light therapy, however, does not mean that disease will become out of control. Discussed possibly stepping down to once a week vs. Stopping it for maintenance. Shared decision making was made where pt felt more comfortable with 1x/wk and moving to stopping during the winter given more clothing and less sun exposure to feet, but does not have to happen at this time. If more areas are appearing, will step back up to 2x/wk. Will see back in the spring and re-address.   - Rash present x 20 years  - >80% BSA on initial consult; visit 10/16/24 with BSA ~ Less than 1%  - Workup (done at )  - Skin bx, L hip 6/2022:   - Atypical CD8 positive T-cell lymphocytic infiltrate with epidermotropism. Marked shift toward CD8 in the CD4:CD8 ratio within the epidermis and there is near total loss of CD7 in the epidermal compartment  - Labs 7/2022:   - Peripheral smear unremarkable  - HIV and HTLV I/II negative  - LDH unremarkable  - Peripheral blood TCR studies showing clonal T cell population but no skin TCR studies to correlate              - Peripheral blood flow cytometry negative for an aberrant T cell population, CD4:CD8 ratio 0.9:1  - PET 8/2022: no metabolic evidence of active neoplasm  - Flow cytometry 1/2024: negative  - Enlarged inguinal LAD - not palpated 10/16/24              - had palpable R inguinal 1 cm node  - Inguinal US 8/2023 at : enlarged bilateral inguinal lymph nodes  - Repeat inguinal US 11/2023 at : stable  - Current: nbUVB at  once weekly (started 7/2022; holding at 7:19 minutes), triamcinolone 0.1% oint   > Shared decision-making to reduce to 1x/wk from 2x/wk   > Discussed abstaining from regular tanning bed d/t different wavelength of light    > Discussed likely lack of benefit of having a home box unit at  this time for phototherapy   ______________________________________    Impression/Plan:      Diagnoses and all orders for this visit:    Hypomelanotic mycosis fungoides (H)  Doing very well on nbUVB with significant improvement in BSA, ~1% today.   - Current: nbUVB at  with Dr Lemus and will decrease to once weekly    > Shared decision-making to reduce to 1x/wk from 2x/wk   > Discussed abstaining from regular tanning bed d/t different wavelength of light    > Discussed likely lack of benefit of having a home box unit at this time for phototherapy    > Hold clobetasol for feet but may use fluticasone as needed  May not completely clear with light therapy, however, does not mean that disease will become out of control. Discussed possibly stepping down to once a week vs. Stopping it for maintenance. Shared decision making was made where pt felt more comfortable with 1x/wk and moving to stopping during the winter given more clothing and less sun exposure to feet, but does not have to happen at this time. If more areas are appearing, will step back up to 2x/wk. Will see back in the spring and re-address.           Follow-up in 6 months.       Staff Involved:  Priyanka Mart MD    CC:   Chief Complaint   Patient presents with    Derm Problem     CTCL- Mostly stable. Still concerned about the feet.       History of Present Illness:  Ms. Jaylon Lugo is a 53 year old female who presents as a return patient.   10/16/24:   States that her hypopigmentation has been improving, but some is still present in her BLE. Denies any pruritus or burning. Has no concerns with continuing light treatment at this time. Appetite has been going well and she denies any night sweats at this time. She does report starting exercise through boxing that she enjoys but did have an injury.     Labs:  None    Physical exam:  Vitals: There were no vitals taken for this visit.  GEN: well developed, well-nourished, in no acute distress, in a  pleasant mood.     Focused exam of feet  - hypopigmented patch of dorsal feet (<1% BSA)    Past Medical History:   History reviewed. No pertinent past medical history.  History reviewed. No pertinent surgical history.    Social History:   reports that she has never smoked. She has never used smokeless tobacco.    Family History:  Family History   Problem Relation Age of Onset    Skin Cancer No family hx of     Melanoma No family hx of        Medications:  Current Outpatient Medications   Medication Sig Dispense Refill    Cholecalciferol (VITAMIN D3 GUMMIES PO)       clobetasol (TEMOVATE) 0.05 % external ointment Apply topically 2 times daily 120 g 5    EPINEPHrine (ANY BX GENERIC EQUIV) 0.3 MG/0.3ML injection 2-pack Inject 0.3 mL intramuscularly as needed. May repeat.      estradiol (ESTRACE) 0.5 MG tablet Take 0.5 mg by mouth daily      famotidine (PEPCID) 20 MG tablet Take 1 tablet by mouth At Bedtime      fluticasone (FLONASE) 50 MCG/ACT nasal spray 1-2 sprays      fluticasone (FLONASE) 50 MCG/ACT nasal spray       loratadine (CLARITIN) 10 MG tablet Take 10 mg by mouth      metroNIDAZOLE (METROCREAM) 0.75 % external cream Apply topically 2 times daily 45 g 2    olopatadine (PATANOL) 0.1 % ophthalmic solution 1 drop      pantoprazole (PROTONIX) 40 MG EC tablet Take 1 tablet by mouth daily      minoxidil (ROGAINE) 5 % external solution  (Patient not taking: Reported on 10/16/2024)      Multiple Vitamin (MULTI-VITAMINS) TABS Take 1 tablet by mouth daily (Patient not taking: Reported on 10/16/2024)      triamcinolone (KENALOG) 0.1 % external ointment  (Patient not taking: Reported on 10/16/2024)       Allergies   Allergen Reactions    Shellfish Allergy Other (See Comments)     Itching and swelling of mouth,lips and possibly throat     Food Other (See Comments)     Red and yellow peppers---itchy tongue

## 2025-04-21 ENCOUNTER — OFFICE VISIT (OUTPATIENT)
Dept: DERMATOLOGY | Facility: CLINIC | Age: 55
End: 2025-04-21
Attending: DERMATOLOGY
Payer: COMMERCIAL

## 2025-04-21 DIAGNOSIS — C84.00 HYPOMELANOTIC MYCOSIS FUNGOIDES (H): Primary | ICD-10-CM

## 2025-04-21 RX ORDER — BIOTIN 10000 MCG
CAPSULE ORAL
COMMUNITY

## 2025-04-21 ASSESSMENT — PAIN SCALES - GENERAL: PAINLEVEL_OUTOF10: MILD PAIN (2)

## 2025-04-21 NOTE — PROGRESS NOTES
[facility] Dermatology Note  Encounter Date: Apr 21, 2025  Office Visit     Dermatology Problem List:  1. Stage 1A, CD8+ hypopigmented MF (previously stage 1B)   - Rash present x 20 years  - >80% BSA on initial consult;   - Workup (done at )  - Skin bx, L hip 6/2022:   - Atypical CD8 positive T-cell lymphocytic infiltrate with epidermotropism. Marked shift toward CD8 in the CD4:CD8 ratio within the epidermis and there is near total loss of CD7 in the epidermal compartment  - Labs 7/2022:   - Peripheral smear unremarkable  - HIV and HTLV I/II negative  - LDH unremarkable  - Peripheral blood TCR studies showing clonal T cell population but no skin TCR studies to correlate              - Peripheral blood flow cytometry negative for an aberrant T cell population, CD4:CD8 ratio 0.9:1  - PET 8/2022: no metabolic evidence of active neoplasm  - Flow cytometry 1/2024: negative  - Enlarged inguinal LAD -               - had palpable R inguinal 1 cm node  - Inguinal US 8/2023 at : enlarged bilateral inguinal lymph nodes  - Repeat inguinal US 11/2023 at : stable  - CT ABD/Pelvis 10/16/24 with no concerns  - Current: nbUVB at  once weekly (started 7/2022; holding at 7:19 minutes), triamcinolone 0.1% oint, clobetasol ointment               ____________________________________________    Assessment & Plan:     # CTCL stage IA, hypopigmented mycosis fungoides  - very small new patch on left lower leg. Since very small and we are heading into spring and summer, we discussed continuing the NBUVB once weekly and using the clobetasol topically to the new patch.  Will get some natural sunlight over the summer.  If more patches appear then consider increasing phototherapy back to twice weekly.       Follow-up: 6 month(s) in-person, or earlier for new or changing lesions    Staff:     Priyanka Mart MD  ____________________________________________    CC: Derm Problem (T cell follow up. Patient reports a new  area of concern on her lower left leg. )    HPI:  Ms. Jaylon Lugo is a(n) 54 year old female who presents today as a return patient for CTCL.  Notes a recent new patch on the left lower leg.  Using clobetasol and does NBUVB once weekly at [facility].  No drenching night sweats or unintentional weight loss.      Patient is otherwise feeling well, without additional skin concerns.     Labs Reviewed:  Normal CBC 4/7/2025 reviewed in [facility]  CT ABD/Pelvis 10/16/24 with no concerns reviewed in [facility]    Physical Exam:  Vitals: There were no vitals taken for this visit.  SKIN: Total skin excluding the undergarment areas but including the buttocks was performed. The exam included the head/face, neck, both arms, chest, back, abdomen, both legs, digits and/or nails.   - hypopigmented patches of left arm and left lower leg (2% BSA)  - stable right inguinal lymph node about 1 cm   - No other lesions of concern on areas examined.     Medications:  Current Outpatient Medications   Medication Sig Dispense Refill    Biotin 10 MG CAPS Take by mouth.      Cholecalciferol (VITAMIN D3 GUMMIES PO)       clobetasol (TEMOVATE) 0.05 % external ointment Apply topically 2 times daily (Patient taking differently: Apply topically 2 times daily. PRN) 120 g 5    EPINEPHrine (ANY BX GENERIC EQUIV) 0.3 MG/0.3ML injection 2-pack Inject 0.3 mL intramuscularly as needed. May repeat.      estradiol (ESTRACE) 0.5 MG tablet Take 0.5 mg by mouth daily      famotidine (PEPCID) 20 MG tablet Take 1 tablet by mouth At Bedtime      fluticasone (FLONASE) 50 MCG/ACT nasal spray 1-2 sprays      fluticasone (FLONASE) 50 MCG/ACT nasal spray       loratadine (CLARITIN) 10 MG tablet Take 10 mg by mouth      olopatadine (PATANOL) 0.1 % ophthalmic solution 1 drop      pantoprazole (PROTONIX) 40 MG EC tablet Take 1 tablet by mouth daily      metroNIDAZOLE (METROCREAM) 0.75 % external cream Apply topically 2 times daily (Patient not taking: Reported on 4/21/2025)  45 g 2    minoxidil (ROGAINE) 5 % external solution  (Patient not taking: Reported on 4/21/2025)      Multiple Vitamin (MULTI-VITAMINS) TABS Take 1 tablet by mouth daily (Patient not taking: Reported on 4/21/2025)      triamcinolone (KENALOG) 0.1 % external ointment  (Patient not taking: Reported on 4/21/2025)       No current facility-administered medications for this visit.      Past Medical History:   There is no problem list on file for this patient.    History reviewed. No pertinent past medical history.    CC Priyanka Mart MD  [address]  [place] [address] on close of this encounter.

## 2025-04-21 NOTE — NURSING NOTE
Dermatology Rooming Note    Jaylon Lugo's goals for this visit include:   Chief Complaint   Patient presents with    Derm Problem     T cell follow up. Patient reports a new area of concern on her lower left leg.      Emmanuel Gallegos, EMT  Clinic Support  [facility]     [phone]    Employed by [facility]

## 2025-04-21 NOTE — LETTER
4/21/2025       RE: Jaylon Lugo  [address]  [address]  [place] [address]     Dear Colleague,    Thank you for referring your patient, Jaylon Lugo, to the [facility] DERMATOLOGY CLINIC [place] at [facility]. Please see a copy of my visit note below.    [facility] Dermatology Note  Encounter Date: Apr 21, 2025  Office Visit     Dermatology Problem List:  1. Stage 1A, CD8+ hypopigmented MF (previously stage 1B)   - Rash present x 20 years  - >80% BSA on initial consult;   - Workup (done at )  - Skin bx, L hip 6/2022:   - Atypical CD8 positive T-cell lymphocytic infiltrate with epidermotropism. Marked shift toward CD8 in the CD4:CD8 ratio within the epidermis and there is near total loss of CD7 in the epidermal compartment  - Labs 7/2022:   - Peripheral smear unremarkable  - HIV and HTLV I/II negative  - LDH unremarkable  - Peripheral blood TCR studies showing clonal T cell population but no skin TCR studies to correlate              - Peripheral blood flow cytometry negative for an aberrant T cell population, CD4:CD8 ratio 0.9:1  - PET 8/2022: no metabolic evidence of active neoplasm  - Flow cytometry 1/2024: negative  - Enlarged inguinal LAD -               - had palpable R inguinal 1 cm node  - Inguinal US 8/2023 at : enlarged bilateral inguinal lymph nodes  - Repeat inguinal US 11/2023 at : stable  - CT ABD/Pelvis 10/16/24 with no concerns  - Current: nbUVB at  once weekly (started 7/2022; holding at 7:19 minutes), triamcinolone 0.1% oint, clobetasol ointment               ____________________________________________    Assessment & Plan:     # CTCL stage IA, hypopigmented mycosis fungoides  - very small new patch on left lower leg. Since very small and we are heading into spring and summer, we discussed continuing the NBUVB once weekly and using the clobetasol topically to the new patch.  Will get some natural  sunlight over the summer.  If more patches appear then consider increasing phototherapy back to twice weekly.       Follow-up: 6 month(s) in-person, or earlier for new or changing lesions    Staff:     Priyanka Mart MD  ____________________________________________    CC: Derm Problem (T cell follow up. Patient reports a new area of concern on her lower left leg. )    HPI:  Ms. Jaylon Lugo is a(n) 54 year old female who presents today as a return patient for CTCL.  Notes a recent new patch on the left lower leg.  Using clobetasol and does NBUVB once weekly at [facility].  No drenching night sweats or unintentional weight loss.      Patient is otherwise feeling well, without additional skin concerns.     Labs Reviewed:  Normal CBC 4/7/2025 reviewed in [facility]  CT ABD/Pelvis 10/16/24 with no concerns reviewed in [facility]    Physical Exam:  Vitals: There were no vitals taken for this visit.  SKIN: Total skin excluding the undergarment areas but including the buttocks was performed. The exam included the head/face, neck, both arms, chest, back, abdomen, both legs, digits and/or nails.   - hypopigmented patches of left arm and left lower leg (2% BSA)  - stable right inguinal lymph node about 1 cm   - No other lesions of concern on areas examined.     Medications:  Current Outpatient Medications   Medication Sig Dispense Refill     Biotin 10 MG CAPS Take by mouth.       Cholecalciferol (VITAMIN D3 GUMMIES PO)        clobetasol (TEMOVATE) 0.05 % external ointment Apply topically 2 times daily (Patient taking differently: Apply topically 2 times daily. PRN) 120 g 5     EPINEPHrine (ANY BX GENERIC EQUIV) 0.3 MG/0.3ML injection 2-pack Inject 0.3 mL intramuscularly as needed. May repeat.       estradiol (ESTRACE) 0.5 MG tablet Take 0.5 mg by mouth daily       famotidine (PEPCID) 20 MG tablet Take 1 tablet by mouth At Bedtime       fluticasone (FLONASE) 50 MCG/ACT nasal spray 1-2 sprays       fluticasone (FLONASE) 50  MCG/ACT nasal spray        loratadine (CLARITIN) 10 MG tablet Take 10 mg by mouth       olopatadine (PATANOL) 0.1 % ophthalmic solution 1 drop       pantoprazole (PROTONIX) 40 MG EC tablet Take 1 tablet by mouth daily       metroNIDAZOLE (METROCREAM) 0.75 % external cream Apply topically 2 times daily (Patient not taking: Reported on 4/21/2025) 45 g 2     minoxidil (ROGAINE) 5 % external solution  (Patient not taking: Reported on 4/21/2025)       Multiple Vitamin (MULTI-VITAMINS) TABS Take 1 tablet by mouth daily (Patient not taking: Reported on 4/21/2025)       triamcinolone (KENALOG) 0.1 % external ointment  (Patient not taking: Reported on 4/21/2025)       No current facility-administered medications for this visit.      Past Medical History:   There is no problem list on file for this patient.    History reviewed. No pertinent past medical history.    CC Priyanka Mart MD  [address] [place] [id number] on close of this encounter.       Again, thank you for allowing me to participate in the care of your patient.      Sincerely,    Priyanka Mart MD

## 2025-06-01 ENCOUNTER — APPOINTMENT (OUTPATIENT)
Dept: CT IMAGING | Facility: CLINIC | Age: 55
End: 2025-06-01
Attending: EMERGENCY MEDICINE
Payer: COMMERCIAL

## 2025-06-01 ENCOUNTER — HOSPITAL ENCOUNTER (EMERGENCY)
Facility: CLINIC | Age: 55
Discharge: HOME | End: 2025-06-01
Attending: EMERGENCY MEDICINE | Admitting: EMERGENCY MEDICINE
Payer: COMMERCIAL

## 2025-06-01 VITALS
TEMPERATURE: 99.1 F | HEART RATE: 79 BPM | BODY MASS INDEX: 35.98 KG/M2 | OXYGEN SATURATION: 100 % | HEIGHT: 69 IN | RESPIRATION RATE: 20 BRPM | SYSTOLIC BLOOD PRESSURE: 162 MMHG | DIASTOLIC BLOOD PRESSURE: 97 MMHG | WEIGHT: 242.95 LBS

## 2025-06-01 DIAGNOSIS — K57.92 DIVERTICULITIS: ICD-10-CM

## 2025-06-01 LAB
ALBUMIN SERPL BCG-MCNC: 4.6 G/DL (ref 3.5–5.2)
ALBUMIN UR-MCNC: NEGATIVE MG/DL
ALP SERPL-CCNC: 122 U/L (ref 40–150)
ALT SERPL W P-5'-P-CCNC: 20 U/L (ref 0–50)
ANION GAP SERPL CALCULATED.3IONS-SCNC: 11 MMOL/L (ref 7–15)
APPEARANCE UR: CLEAR
AST SERPL W P-5'-P-CCNC: 23 U/L (ref 0–45)
BASOPHILS # BLD AUTO: 0 10E3/UL (ref 0–0.2)
BASOPHILS NFR BLD AUTO: 0 %
BILIRUB SERPL-MCNC: 0.4 MG/DL
BILIRUB UR QL STRIP: NEGATIVE
BUN SERPL-MCNC: 9.5 MG/DL (ref 6–20)
CALCIUM SERPL-MCNC: 9.4 MG/DL (ref 8.8–10.4)
CHLORIDE SERPL-SCNC: 102 MMOL/L (ref 98–107)
COLOR UR AUTO: NORMAL
CREAT SERPL-MCNC: 0.79 MG/DL (ref 0.51–0.95)
EGFRCR SERPLBLD CKD-EPI 2021: 89 ML/MIN/1.73M2
EOSINOPHIL # BLD AUTO: 0.2 10E3/UL (ref 0–0.7)
EOSINOPHIL NFR BLD AUTO: 2 %
ERYTHROCYTE [DISTWIDTH] IN BLOOD BY AUTOMATED COUNT: 13.5 % (ref 10–15)
GLUCOSE SERPL-MCNC: 101 MG/DL (ref 70–99)
GLUCOSE UR STRIP-MCNC: NEGATIVE MG/DL
HCO3 SERPL-SCNC: 25 MMOL/L (ref 22–29)
HCT VFR BLD AUTO: 39.9 % (ref 35–47)
HGB BLD-MCNC: 13.2 G/DL (ref 11.7–15.7)
HGB UR QL STRIP: NEGATIVE
IMM GRANULOCYTES # BLD: 0 10E3/UL
IMM GRANULOCYTES NFR BLD: 0 %
KETONES UR STRIP-MCNC: NEGATIVE MG/DL
LEUKOCYTE ESTERASE UR QL STRIP: NEGATIVE
LIPASE SERPL-CCNC: 18 U/L (ref 13–60)
LYMPHOCYTES # BLD AUTO: 3.3 10E3/UL (ref 0.8–5.3)
LYMPHOCYTES NFR BLD AUTO: 39 %
MCH RBC QN AUTO: 27.4 PG (ref 26.5–33)
MCHC RBC AUTO-ENTMCNC: 33.1 G/DL (ref 31.5–36.5)
MCV RBC AUTO: 83 FL (ref 78–100)
MONOCYTES # BLD AUTO: 0.7 10E3/UL (ref 0–1.3)
MONOCYTES NFR BLD AUTO: 8 %
NEUTROPHILS # BLD AUTO: 4.3 10E3/UL (ref 1.6–8.3)
NEUTROPHILS NFR BLD AUTO: 50 %
NITRATE UR QL: NEGATIVE
NRBC # BLD AUTO: 0 10E3/UL
NRBC BLD AUTO-RTO: 0 /100
PH UR STRIP: 6.5 [PH] (ref 5–7)
PLATELET # BLD AUTO: 327 10E3/UL (ref 150–450)
POTASSIUM SERPL-SCNC: 3.4 MMOL/L (ref 3.4–5.3)
PROT SERPL-MCNC: 8.2 G/DL (ref 6.4–8.3)
RBC # BLD AUTO: 4.82 10E6/UL (ref 3.8–5.2)
RBC URINE: 1 /HPF
SODIUM SERPL-SCNC: 138 MMOL/L (ref 135–145)
SP GR UR STRIP: 1.01 (ref 1–1.03)
SQUAMOUS EPITHELIAL: 1 /HPF
UROBILINOGEN UR STRIP-MCNC: NORMAL MG/DL
WBC # BLD AUTO: 8.6 10E3/UL (ref 4–11)
WBC URINE: <1 /HPF

## 2025-06-01 PROCEDURE — 250N000011 HC RX IP 250 OP 636: Performed by: EMERGENCY MEDICINE

## 2025-06-01 PROCEDURE — 74177 CT ABD & PELVIS W/CONTRAST: CPT

## 2025-06-01 PROCEDURE — 82310 ASSAY OF CALCIUM: CPT | Performed by: EMERGENCY MEDICINE

## 2025-06-01 PROCEDURE — 250N000013 HC RX MED GY IP 250 OP 250 PS 637: Performed by: EMERGENCY MEDICINE

## 2025-06-01 PROCEDURE — 250N000011 HC RX IP 250 OP 636: Mod: JZ | Performed by: EMERGENCY MEDICINE

## 2025-06-01 PROCEDURE — 83690 ASSAY OF LIPASE: CPT | Performed by: EMERGENCY MEDICINE

## 2025-06-01 PROCEDURE — 36415 COLL VENOUS BLD VENIPUNCTURE: CPT | Performed by: EMERGENCY MEDICINE

## 2025-06-01 PROCEDURE — 99285 EMERGENCY DEPT VISIT HI MDM: CPT | Mod: 25

## 2025-06-01 PROCEDURE — 258N000003 HC RX IP 258 OP 636: Performed by: EMERGENCY MEDICINE

## 2025-06-01 PROCEDURE — 96374 THER/PROPH/DIAG INJ IV PUSH: CPT | Mod: 59

## 2025-06-01 PROCEDURE — 80053 COMPREHEN METABOLIC PANEL: CPT | Performed by: EMERGENCY MEDICINE

## 2025-06-01 PROCEDURE — 85025 COMPLETE CBC W/AUTO DIFF WBC: CPT | Performed by: EMERGENCY MEDICINE

## 2025-06-01 PROCEDURE — 96361 HYDRATE IV INFUSION ADD-ON: CPT

## 2025-06-01 PROCEDURE — 250N000009 HC RX 250: Performed by: EMERGENCY MEDICINE

## 2025-06-01 PROCEDURE — 81001 URINALYSIS AUTO W/SCOPE: CPT | Performed by: EMERGENCY MEDICINE

## 2025-06-01 RX ORDER — ONDANSETRON 4 MG/1
4 TABLET, ORALLY DISINTEGRATING ORAL EVERY 8 HOURS PRN
Qty: 10 TABLET | Refills: 0 | Status: SHIPPED | OUTPATIENT
Start: 2025-06-01 | End: 2025-06-04

## 2025-06-01 RX ORDER — KETOROLAC TROMETHAMINE 15 MG/ML
15 INJECTION, SOLUTION INTRAMUSCULAR; INTRAVENOUS ONCE
Status: COMPLETED | OUTPATIENT
Start: 2025-06-01 | End: 2025-06-01

## 2025-06-01 RX ORDER — AMOXICILLIN AND CLAVULANATE POTASSIUM 400; 57 MG/5ML; MG/5ML
875 POWDER, FOR SUSPENSION ORAL 2 TIMES DAILY
Qty: 153.16 ML | Refills: 0 | Status: SHIPPED | OUTPATIENT
Start: 2025-06-01 | End: 2025-06-08

## 2025-06-01 RX ORDER — OXYCODONE HYDROCHLORIDE 5 MG/1
5 TABLET ORAL EVERY 6 HOURS PRN
Qty: 12 TABLET | Refills: 0 | Status: SHIPPED | OUTPATIENT
Start: 2025-06-01 | End: 2025-06-04

## 2025-06-01 RX ORDER — IOPAMIDOL 755 MG/ML
500 INJECTION, SOLUTION INTRAVASCULAR ONCE
Status: COMPLETED | OUTPATIENT
Start: 2025-06-01 | End: 2025-06-01

## 2025-06-01 RX ADMIN — KETOROLAC TROMETHAMINE 15 MG: 15 INJECTION, SOLUTION INTRAMUSCULAR; INTRAVENOUS at 02:11

## 2025-06-01 RX ADMIN — SODIUM CHLORIDE 69 ML: 9 INJECTION, SOLUTION INTRAVENOUS at 04:28

## 2025-06-01 RX ADMIN — AMOXICILLIN AND CLAVULANATE POTASSIUM 1 TABLET: 875; 125 TABLET, FILM COATED ORAL at 05:47

## 2025-06-01 RX ADMIN — IOPAMIDOL 101 ML: 755 INJECTION, SOLUTION INTRAVENOUS at 04:28

## 2025-06-01 RX ADMIN — SODIUM CHLORIDE 1000 ML: 0.9 INJECTION, SOLUTION INTRAVENOUS at 02:09

## 2025-06-01 ASSESSMENT — COLUMBIA-SUICIDE SEVERITY RATING SCALE - C-SSRS
6. HAVE YOU EVER DONE ANYTHING, STARTED TO DO ANYTHING, OR PREPARED TO DO ANYTHING TO END YOUR LIFE?: NO
2. HAVE YOU ACTUALLY HAD ANY THOUGHTS OF KILLING YOURSELF IN THE PAST MONTH?: NO
1. IN THE PAST MONTH, HAVE YOU WISHED YOU WERE DEAD OR WISHED YOU COULD GO TO SLEEP AND NOT WAKE UP?: NO

## 2025-06-01 ASSESSMENT — ACTIVITIES OF DAILY LIVING (ADL)
ADLS_ACUITY_SCORE: 41
ADLS_ACUITY_SCORE: 41

## 2025-06-01 NOTE — ED TRIAGE NOTES
Pt arrives with severe right lower abdominal pain that started 40 mins PTA. History of hysterectomy. Notes dry throat started about the same time. Worse with coughing. BM today. Denies any problems with urination.

## 2025-06-01 NOTE — ED PROVIDER NOTES
Emergency Department Note      History of Present Illness     Chief Complaint   Abdominal Pain      HPI   Jaylon Lugo is a 54 year old female with a history of GERD presenting for evaluation of abdominal pain. The patient endorses abdominal pain that started shortly before arriving to the emergency department. Her pain started in the right lower quadrant of her abdomen, but it moves to the center part of her lower abdomen when she twists or moves. She had throat discomfort and a headache when her abdominal pain first started, but both feel better now. When urinating, she describes that she feels a heaviness similar to the feeling of having hemorrhoids. She still has her appendix. She had a hysterectomy but still has her right ovary. She had a navel hernia repair as a baby. The patient denies nausea, vomiting, fever, chills, having episodes like this before, other abdominal surgical history, and history of kidney stones.    Independent Historian   None    Past Medical History     Medical History and Problem List   No past medical history on file.    Medications   amoxicillin-clavulanate (AUGMENTIN) 875-125 MG tablet  ondansetron (ZOFRAN ODT) 4 MG ODT tab  oxyCODONE (ROXICODONE) 5 MG tablet  Biotin 10 MG CAPS  Cholecalciferol (VITAMIN D3 GUMMIES PO)  clobetasol (TEMOVATE) 0.05 % external ointment  EPINEPHrine (ANY BX GENERIC EQUIV) 0.3 MG/0.3ML injection 2-pack  estradiol (ESTRACE) 0.5 MG tablet  famotidine (PEPCID) 20 MG tablet  fluticasone (FLONASE) 50 MCG/ACT nasal spray  fluticasone (FLONASE) 50 MCG/ACT nasal spray  loratadine (CLARITIN) 10 MG tablet  metroNIDAZOLE (METROCREAM) 0.75 % external cream  minoxidil (ROGAINE) 5 % external solution  Multiple Vitamin (MULTI-VITAMINS) TABS  olopatadine (PATANOL) 0.1 % ophthalmic solution  pantoprazole (PROTONIX) 40 MG EC tablet  triamcinolone (KENALOG) 0.1 % external ointment        Surgical History   No past surgical history on file.    Physical Exam     Patient  "Vitals for the past 24 hrs:   BP Temp Temp src Pulse Resp SpO2 Height Weight   06/01/25 0200 (!) 162/97 99.1  F (37.3  C) Oral 79 20 100 % 1.753 m (5' 9\") 110.2 kg (242 lb 15.2 oz)     Physical Exam  General: Patient is awake, alert and interactive when I enter the room. Appears uncomfortable.   Head: The scalp, face, and head appear normal  Eyes: Conjunctivae and sclerae are normal  Neck: Normal range of motion.   CV: Regular rate.   Resp:  No respiratory distress.   GI: RLQ tenderness but abdomen is soft, no rigidity. No evidence of pulsatile mass. No fluid waves or evidence of ascites. No distension. No hernias or bruising are noted in detailed exam. No CVA tenderness.    MS: Normal tone.   Skin: Normal capillary refill noted  Neuro: Speech is normal and fluent. Face is symmetric. Moving all extremities.   Psych:  Normal affect.  Appropriate interactions.    Diagnostics     Lab Results   Labs Ordered and Resulted from Time of ED Arrival to Time of ED Departure   COMPREHENSIVE METABOLIC PANEL - Abnormal       Result Value    Sodium 138      Potassium 3.4      Carbon Dioxide (CO2) 25      Anion Gap 11      Urea Nitrogen 9.5      Creatinine 0.79      GFR Estimate 89      Calcium 9.4      Chloride 102      Glucose 101 (*)     Alkaline Phosphatase 122      AST 23      ALT 20      Protein Total 8.2      Albumin 4.6      Bilirubin Total 0.4     ROUTINE UA WITH MICROSCOPIC REFLEX TO CULTURE - Normal    Color Urine Straw      Appearance Urine Clear      Glucose Urine Negative      Bilirubin Urine Negative      Ketones Urine Negative      Specific Gravity Urine 1.011      Blood Urine Negative      pH Urine 6.5      Protein Albumin Urine Negative      Urobilinogen Urine Normal      Nitrite Urine Negative      Leukocyte Esterase Urine Negative      RBC Urine 1      WBC Urine <1      Squamous Epithelials Urine 1     LIPASE - Normal    Lipase 18     CBC WITH PLATELETS AND DIFFERENTIAL    WBC Count 8.6      RBC Count 4.82      " Hemoglobin 13.2      Hematocrit 39.9      MCV 83      MCH 27.4      MCHC 33.1      RDW 13.5      Platelet Count 327      % Neutrophils 50      % Lymphocytes 39      % Monocytes 8      % Eosinophils 2      % Basophils 0      % Immature Granulocytes 0      NRBCs per 100 WBC 0      Absolute Neutrophils 4.3      Absolute Lymphocytes 3.3      Absolute Monocytes 0.7      Absolute Eosinophils 0.2      Absolute Basophils 0.0      Absolute Immature Granulocytes 0.0      Absolute NRBCs 0.0         Imaging   CT Abdomen Pelvis w Contrast   Final Result   IMPRESSION:    1.  Small amount of pericolonic fluid seen adjacent to the mid descending colon with adjacent minimal pericolonic thickening, which may reflect resolving and/or subacute diverticulitis.   2.  Normal appendix.   3.  Mild cardiomegaly.                Independent Interpretation   None    ED Course      Medications Administered   Medications   amoxicillin-clavulanate (AUGMENTIN) 875-125 MG per tablet 1 tablet (has no administration in time range)   sodium chloride 0.9% BOLUS 1,000 mL (0 mLs Intravenous Stopped 6/1/25 0402)   ketorolac (TORADOL) injection 15 mg (15 mg Intravenous $Given 6/1/25 0211)   iopamidol (ISOVUE-370) solution 500 mL (101 mLs Intravenous $Given 6/1/25 0428)   sodium chloride 0.9 % bag for CT scan flush (69 mLs Intravenous $Given 6/1/25 0428)       Procedures   Procedures     Discussion of Management   None    ED Course   ED Course as of 06/01/25 0529   Sun Jun 01, 2025 0344 I obtained history and performed physical exam.   0525 I updated the patient on her results. She feels better and is comfortable going home.       Additional Documentation  None    Medical Decision Making / Diagnosis     ALANNA Ferreirahawa SABINO Lugo is a 54 year old female who presents with abdominal pain. A broad differential diagnosis was considered including urinary obstruction, colitis, appendicitis, intestinal cramping, aortic pathologies, pyelonephritis, ureterolithiasis,  UTI, constipation, diverticulitis, obstruction, ileus, volvulus, etc as possibilities. CT confirms diverticulitis without abscess or perforation. On recheck, she has a non-surgical exam, pain is controlled, and she is tolerating POs.  I discussed indications for admission versus discharge and together we opted for outpatient management.  I will prescribe augmentin and supportive medications as per below.  We discussed the natural history of this problem, and I discussed dietary precautions. I recommended she return to the ED for worsening pain, fever, vomiting, bloody or black stools, or for any other concerning symptoms. I recommended she follow up with her primary care physician in 2-3 days. All question were answered and the patient is amenable for discharge at this time.      Disposition   The patient was discharged.     Diagnosis     ICD-10-CM    1. Diverticulitis  K57.92            Discharge Medications   New Prescriptions    AMOXICILLIN-CLAVULANATE (AUGMENTIN) 875-125 MG TABLET    Take 1 tablet by mouth 2 times daily for 7 days.    ONDANSETRON (ZOFRAN ODT) 4 MG ODT TAB    Take 1 tablet (4 mg) by mouth every 8 hours as needed for nausea.    OXYCODONE (ROXICODONE) 5 MG TABLET    Take 1 tablet (5 mg) by mouth every 6 hours as needed for breakthrough pain.     Scribe Disclosure:  I, Reva Pisano, am serving as a scribe at 3:49 AM on 6/1/2025 to document services personally performed by Moose Godoy MD based on my observations and the provider's statements to me.        Moose Godoy MD  06/01/25 [id number]